# Patient Record
Sex: MALE | Race: BLACK OR AFRICAN AMERICAN | NOT HISPANIC OR LATINO | Employment: OTHER | URBAN - METROPOLITAN AREA
[De-identification: names, ages, dates, MRNs, and addresses within clinical notes are randomized per-mention and may not be internally consistent; named-entity substitution may affect disease eponyms.]

---

## 2024-04-07 ENCOUNTER — APPOINTMENT (OUTPATIENT)
Dept: MRI IMAGING | Facility: CLINIC | Age: 69
DRG: 065 | End: 2024-04-07
Attending: STUDENT IN AN ORGANIZED HEALTH CARE EDUCATION/TRAINING PROGRAM
Payer: MEDICARE

## 2024-04-07 ENCOUNTER — HOSPITAL ENCOUNTER (INPATIENT)
Facility: CLINIC | Age: 69
LOS: 3 days | Discharge: HOME HEALTH | DRG: 065 | End: 2024-04-10
Attending: STUDENT IN AN ORGANIZED HEALTH CARE EDUCATION/TRAINING PROGRAM | Admitting: INTERNAL MEDICINE
Payer: MEDICARE

## 2024-04-07 DIAGNOSIS — R47.1 DYSARTHRIA: ICD-10-CM

## 2024-04-07 DIAGNOSIS — R29.898 RIGHT ARM WEAKNESS: ICD-10-CM

## 2024-04-07 DIAGNOSIS — I10 HYPERTENSION, UNSPECIFIED TYPE: ICD-10-CM

## 2024-04-07 DIAGNOSIS — I63.9 ACUTE ISCHEMIC STROKE (H): Primary | ICD-10-CM

## 2024-04-07 DIAGNOSIS — R20.0 NUMBNESS: ICD-10-CM

## 2024-04-07 DIAGNOSIS — E11.9 TYPE 2 DIABETES MELLITUS WITHOUT COMPLICATION, UNSPECIFIED WHETHER LONG TERM INSULIN USE (H): ICD-10-CM

## 2024-04-07 DIAGNOSIS — R79.89 ELEVATED TROPONIN: ICD-10-CM

## 2024-04-07 LAB
ANION GAP SERPL CALCULATED.3IONS-SCNC: 16 MMOL/L (ref 7–15)
APTT PPP: 34 SECONDS (ref 22–38)
ATRIAL RATE - MUSE: 105 BPM
BASOPHILS # BLD AUTO: 0 10E3/UL (ref 0–0.2)
BASOPHILS NFR BLD AUTO: 0 %
BUN SERPL-MCNC: 18.3 MG/DL (ref 8–23)
CALCIUM SERPL-MCNC: 9.1 MG/DL (ref 8.8–10.2)
CHLORIDE SERPL-SCNC: 100 MMOL/L (ref 98–107)
CHOLEST SERPL-MCNC: 181 MG/DL
CREAT SERPL-MCNC: 1.23 MG/DL (ref 0.67–1.17)
DEPRECATED HCO3 PLAS-SCNC: 22 MMOL/L (ref 22–29)
DIASTOLIC BLOOD PRESSURE - MUSE: NORMAL MMHG
EGFRCR SERPLBLD CKD-EPI 2021: 63 ML/MIN/1.73M2
EOSINOPHIL # BLD AUTO: 0 10E3/UL (ref 0–0.7)
EOSINOPHIL NFR BLD AUTO: 1 %
ERYTHROCYTE [DISTWIDTH] IN BLOOD BY AUTOMATED COUNT: 12.5 % (ref 10–15)
GLUCOSE BLDC GLUCOMTR-MCNC: 117 MG/DL (ref 70–99)
GLUCOSE BLDC GLUCOMTR-MCNC: 262 MG/DL (ref 70–99)
GLUCOSE SERPL-MCNC: 263 MG/DL (ref 70–99)
HBA1C MFR BLD: 7.3 %
HCT VFR BLD AUTO: 36.8 % (ref 40–53)
HDLC SERPL-MCNC: 28 MG/DL
HGB BLD-MCNC: 12.4 G/DL (ref 13.3–17.7)
IMM GRANULOCYTES # BLD: 0 10E3/UL
IMM GRANULOCYTES NFR BLD: 0 %
INR PPP: 1.07 (ref 0.85–1.15)
INTERPRETATION ECG - MUSE: NORMAL
LDLC SERPL CALC-MCNC: 113 MG/DL
LYMPHOCYTES # BLD AUTO: 1 10E3/UL (ref 0.8–5.3)
LYMPHOCYTES NFR BLD AUTO: 32 %
MCH RBC QN AUTO: 27.1 PG (ref 26.5–33)
MCHC RBC AUTO-ENTMCNC: 33.7 G/DL (ref 31.5–36.5)
MCV RBC AUTO: 81 FL (ref 78–100)
MONOCYTES # BLD AUTO: 0.2 10E3/UL (ref 0–1.3)
MONOCYTES NFR BLD AUTO: 7 %
NEUTROPHILS # BLD AUTO: 1.7 10E3/UL (ref 1.6–8.3)
NEUTROPHILS NFR BLD AUTO: 60 %
NONHDLC SERPL-MCNC: 153 MG/DL
NRBC # BLD AUTO: 0 10E3/UL
NRBC BLD AUTO-RTO: 0 /100
P AXIS - MUSE: 266 DEGREES
PLATELET # BLD AUTO: 123 10E3/UL (ref 150–450)
POTASSIUM SERPL-SCNC: 3.7 MMOL/L (ref 3.4–5.3)
PR INTERVAL - MUSE: 166 MS
QRS DURATION - MUSE: 76 MS
QT - MUSE: 350 MS
QTC - MUSE: 462 MS
R AXIS - MUSE: -4 DEGREES
RBC # BLD AUTO: 4.57 10E6/UL (ref 4.4–5.9)
SODIUM SERPL-SCNC: 138 MMOL/L (ref 135–145)
SYSTOLIC BLOOD PRESSURE - MUSE: NORMAL MMHG
T AXIS - MUSE: 268 DEGREES
TRIGL SERPL-MCNC: 199 MG/DL
TROPONIN T SERPL HS-MCNC: 74 NG/L
TROPONIN T SERPL HS-MCNC: 76 NG/L
TROPONIN T SERPL HS-MCNC: 93 NG/L
TSH SERPL DL<=0.005 MIU/L-ACNC: 2.22 UIU/ML (ref 0.3–4.2)
VENTRICULAR RATE- MUSE: 105 BPM
WBC # BLD AUTO: 3 10E3/UL (ref 4–11)

## 2024-04-07 PROCEDURE — 70549 MR ANGIOGRAPH NECK W/O&W/DYE: CPT

## 2024-04-07 PROCEDURE — 99223 1ST HOSP IP/OBS HIGH 75: CPT | Mod: AI | Performed by: INTERNAL MEDICINE

## 2024-04-07 PROCEDURE — A9585 GADOBUTROL INJECTION: HCPCS | Performed by: STUDENT IN AN ORGANIZED HEALTH CARE EDUCATION/TRAINING PROGRAM

## 2024-04-07 PROCEDURE — 85610 PROTHROMBIN TIME: CPT | Performed by: STUDENT IN AN ORGANIZED HEALTH CARE EDUCATION/TRAINING PROGRAM

## 2024-04-07 PROCEDURE — 70544 MR ANGIOGRAPHY HEAD W/O DYE: CPT

## 2024-04-07 PROCEDURE — 85730 THROMBOPLASTIN TIME PARTIAL: CPT | Performed by: STUDENT IN AN ORGANIZED HEALTH CARE EDUCATION/TRAINING PROGRAM

## 2024-04-07 PROCEDURE — 85025 COMPLETE CBC W/AUTO DIFF WBC: CPT | Performed by: STUDENT IN AN ORGANIZED HEALTH CARE EDUCATION/TRAINING PROGRAM

## 2024-04-07 PROCEDURE — 99285 EMERGENCY DEPT VISIT HI MDM: CPT | Mod: 25

## 2024-04-07 PROCEDURE — 82962 GLUCOSE BLOOD TEST: CPT

## 2024-04-07 PROCEDURE — 255N000002 HC RX 255 OP 636: Performed by: STUDENT IN AN ORGANIZED HEALTH CARE EDUCATION/TRAINING PROGRAM

## 2024-04-07 PROCEDURE — 80048 BASIC METABOLIC PNL TOTAL CA: CPT | Performed by: STUDENT IN AN ORGANIZED HEALTH CARE EDUCATION/TRAINING PROGRAM

## 2024-04-07 PROCEDURE — 36415 COLL VENOUS BLD VENIPUNCTURE: CPT | Performed by: STUDENT IN AN ORGANIZED HEALTH CARE EDUCATION/TRAINING PROGRAM

## 2024-04-07 PROCEDURE — 83036 HEMOGLOBIN GLYCOSYLATED A1C: CPT | Performed by: INTERNAL MEDICINE

## 2024-04-07 PROCEDURE — 84484 ASSAY OF TROPONIN QUANT: CPT | Performed by: INTERNAL MEDICINE

## 2024-04-07 PROCEDURE — 93005 ELECTROCARDIOGRAM TRACING: CPT

## 2024-04-07 PROCEDURE — 84484 ASSAY OF TROPONIN QUANT: CPT | Performed by: STUDENT IN AN ORGANIZED HEALTH CARE EDUCATION/TRAINING PROGRAM

## 2024-04-07 PROCEDURE — 36415 COLL VENOUS BLD VENIPUNCTURE: CPT | Performed by: INTERNAL MEDICINE

## 2024-04-07 PROCEDURE — 84443 ASSAY THYROID STIM HORMONE: CPT | Performed by: INTERNAL MEDICINE

## 2024-04-07 PROCEDURE — 80061 LIPID PANEL: CPT | Performed by: INTERNAL MEDICINE

## 2024-04-07 PROCEDURE — 120N000001 HC R&B MED SURG/OB

## 2024-04-07 PROCEDURE — 70553 MRI BRAIN STEM W/O & W/DYE: CPT

## 2024-04-07 RX ORDER — ONDANSETRON 4 MG/1
4 TABLET, ORALLY DISINTEGRATING ORAL EVERY 6 HOURS PRN
Status: DISCONTINUED | OUTPATIENT
Start: 2024-04-07 | End: 2024-04-10 | Stop reason: HOSPADM

## 2024-04-07 RX ORDER — NICOTINE POLACRILEX 4 MG
15-30 LOZENGE BUCCAL
Status: DISCONTINUED | OUTPATIENT
Start: 2024-04-07 | End: 2024-04-10 | Stop reason: HOSPADM

## 2024-04-07 RX ORDER — ASPIRIN 81 MG/1
324 TABLET, CHEWABLE ORAL DAILY
Status: DISCONTINUED | OUTPATIENT
Start: 2024-04-08 | End: 2024-04-10 | Stop reason: HOSPADM

## 2024-04-07 RX ORDER — ASPIRIN 300 MG/1
300 SUPPOSITORY RECTAL DAILY
Status: DISCONTINUED | OUTPATIENT
Start: 2024-04-08 | End: 2024-04-10 | Stop reason: HOSPADM

## 2024-04-07 RX ORDER — GADOBUTROL 604.72 MG/ML
10 INJECTION INTRAVENOUS ONCE
Status: COMPLETED | OUTPATIENT
Start: 2024-04-07 | End: 2024-04-07

## 2024-04-07 RX ORDER — ONDANSETRON 2 MG/ML
4 INJECTION INTRAMUSCULAR; INTRAVENOUS EVERY 6 HOURS PRN
Status: DISCONTINUED | OUTPATIENT
Start: 2024-04-07 | End: 2024-04-10 | Stop reason: HOSPADM

## 2024-04-07 RX ORDER — ASPIRIN 325 MG
325 TABLET, DELAYED RELEASE (ENTERIC COATED) ORAL DAILY
Status: DISCONTINUED | OUTPATIENT
Start: 2024-04-08 | End: 2024-04-10 | Stop reason: HOSPADM

## 2024-04-07 RX ORDER — LIDOCAINE 40 MG/G
CREAM TOPICAL
Status: DISCONTINUED | OUTPATIENT
Start: 2024-04-07 | End: 2024-04-10 | Stop reason: HOSPADM

## 2024-04-07 RX ORDER — HYDRALAZINE HYDROCHLORIDE 20 MG/ML
10-20 INJECTION INTRAMUSCULAR; INTRAVENOUS
Status: DISCONTINUED | OUTPATIENT
Start: 2024-04-07 | End: 2024-04-10 | Stop reason: HOSPADM

## 2024-04-07 RX ORDER — DEXTROSE MONOHYDRATE 25 G/50ML
25-50 INJECTION, SOLUTION INTRAVENOUS
Status: DISCONTINUED | OUTPATIENT
Start: 2024-04-07 | End: 2024-04-10 | Stop reason: HOSPADM

## 2024-04-07 RX ORDER — LABETALOL HYDROCHLORIDE 5 MG/ML
10-20 INJECTION, SOLUTION INTRAVENOUS EVERY 10 MIN PRN
Status: DISCONTINUED | OUTPATIENT
Start: 2024-04-07 | End: 2024-04-10 | Stop reason: HOSPADM

## 2024-04-07 RX ADMIN — GADOBUTROL 10 ML: 604.72 INJECTION INTRAVENOUS at 18:12

## 2024-04-07 ASSESSMENT — ACTIVITIES OF DAILY LIVING (ADL)
ADLS_ACUITY_SCORE: 35
ADLS_ACUITY_SCORE: 33
ADLS_ACUITY_SCORE: 35
ADLS_ACUITY_SCORE: 33

## 2024-04-07 NOTE — ED NOTES
[facility]  ED Nurse Handoff Report    ED Chief complaint: Numbness (/)      ED Diagnosis:   Final diagnoses:   Acute ischemic stroke (H)       Code Status: not addressed at this time    Allergies: No Known Allergies    Patient Story: Pt thinks he has a stroke on Thursday   Pt has numbness in his right hand   Focused Assessment:  Pt forgetful, disoriented to situation. HTN,  on RA. Son at bedside,  Labs Ordered and Resulted from Time of ED Arrival to Time of ED Departure   BASIC METABOLIC PANEL - Abnormal       Result Value    Sodium 138      Potassium 3.7      Chloride 100      Carbon Dioxide (CO2) 22      Anion Gap 16 (*)     Urea Nitrogen 18.3      Creatinine 1.23 (*)     GFR Estimate 63      Calcium 9.1      Glucose 263 (*)    TROPONIN T, HIGH SENSITIVITY - Abnormal    Troponin T, High Sensitivity 74 (*)    GLUCOSE BY METER - Abnormal    GLUCOSE BY METER POCT 262 (*)    CBC WITH PLATELETS AND DIFFERENTIAL - Abnormal    WBC Count 3.0 (*)     RBC Count 4.57      Hemoglobin 12.4 (*)     Hematocrit 36.8 (*)     MCV 81      MCH 27.1      MCHC 33.7      RDW 12.5      Platelet Count 123 (*)     % Neutrophils 60      % Lymphocytes 32      % Monocytes 7      % Eosinophils 1      % Basophils 0      % Immature Granulocytes 0      NRBCs per 100 WBC 0      Absolute Neutrophils 1.7      Absolute Lymphocytes 1.0      Absolute Monocytes 0.2      Absolute Eosinophils 0.0      Absolute Basophils 0.0      Absolute Immature Granulocytes 0.0      Absolute NRBCs 0.0     TROPONIN T, HIGH SENSITIVITY - Abnormal    Troponin T, High Sensitivity 76 (*)    INR - Normal    INR 1.07     PARTIAL THROMBOPLASTIN TIME - Normal    aPTT 34     GLUCOSE MONITOR NURSING POCT      MRA Brain ([place]) wo Contrast   Final Result   IMPRESSION:   HEAD MRI:   1.  Acute lacunar infarcts at the left central maxine and left frontoparietal corona radiata. No associated hemorrhage.   2.  Moderate chronic small vessel ischemic change.     "  HEAD MRA:   Normal MRA [place].      NECK MRA:   Normal neck MRA.         MRA Neck ([facility]ot[facility]) wo & w Contrast   Final Result   IMPRESSION:   HEAD MRI:   1.  Acute lacunar infarcts at the left central maxine and left frontoparietal corona radiata. No associated hemorrhage.   2.  Moderate chronic small vessel ischemic change.      HEAD MRA:   Normal MRA [place].      NECK MRA:   Normal neck MRA.         MR Brain w/o & w Contrast   Final Result   IMPRESSION:   HEAD MRI:   1.  Acute lacunar infarcts at the left central maxine and left frontoparietal corona radiata. No associated hemorrhage.   2.  Moderate chronic small vessel ischemic change.      HEAD MRA:   Normal MRA [place].      NECK MRA:   Normal neck MRA.              Treatments and/or interventions provided: see MAR  Patient's response to treatments and/or interventions: cont to assess    To be done/followed up on inpatient unit:  cont with admitting MD orders    Does this patient have any cognitive concerns?: Forgetful    Activity level - Baseline/Home:  Independent  Activity Level - Current:   Stand with Assist    Patient's Preferred language: English   Needed?: No    Isolation: None  Infection: Not Applicable  Patient tested for COVID 19 prior to admission: NO  Bariatric?: No    Vital Signs:   Vitals:    04/07/24 1500 04/07/24 1600 04/07/24 1630 04/07/24 1700   BP: (!) 230/119 (!) 171/99 (!) 180/106 (!) 176/105   Pulse: 103 92 93 83   Resp: 16   18   Temp: 98  F (36.7  C)      TempSrc: Oral      SpO2: 99% 94% 94% 96%   Weight: 99.8 kg (220 lb)      Height: 1.778 m (5' 10\")          Cardiac Rhythm:     Was the PSS-3 completed:   Yes  What interventions are required if any?               Family Comments: at bedside      For the majority of the shift this patient's behavior was Green.   Behavioral interventions performed were none.    ED NURSE PHONE NUMBER: [phone]        "

## 2024-04-07 NOTE — ED PROVIDER NOTES
"History   Chief Complaint:  Numbness     HPI:  Chucho Ac is a pleasant 68 year old male with history of hypertension, hypertriglyceridemia, and type 2 diabetes presenting with numbness. The patient's son reports that the patient lives alone and had an unwitnessed stroke 3 days ago without informing anyone of it. The patient states that at the time he was having difficulty speaking and numbness in his right hand and right leg. He was not medically evaluated for the stroke. Son states that he noticed the patient was still residually weak and slurring his speech more than his baseline, so they drove to the ED for evaluation. The patient denies vision changes, feelings of unsteadiness, fever, chills, nausea, vomiting, diarrhea, or cough. No past history of stroke. The patient notes that he regularly takes 24 units of insulin once a day as well as lisinopril-hydrochlorothiazide for his hypertension, though he has not been taking his hypertensive for about 2 months due to running out of his supply.         Independent Historian:  Independent history was obtained from the patient's son. They provided information detailed above in HPI.     Review of External Notes:  None.    I personally reviewed the patient's chart, including available medication list and available past medical history, past surgical history, family history, and social history.    Physical Exam   Patient Vitals for the past 24 hrs:   BP Temp Temp src Pulse Resp SpO2 Height Weight   04/07/24 2044 (!) 178/128 -- -- 80 18 -- -- --   04/07/24 2003 -- -- -- 87 -- 94 % -- --   04/07/24 1900 (!) 175/120 -- -- 78 -- 96 % -- --   04/07/24 1700 (!) 176/105 -- -- 83 18 96 % -- --   04/07/24 1630 (!) 180/106 -- -- 93 -- 94 % -- --   04/07/24 1600 (!) 171/99 -- -- 92 -- 94 % -- --   04/07/24 1500 (!) 230/119 98  F (36.7  C) Oral 103 16 99 % 1.778 m (5' 10\") 99.8 kg (220 lb)      Physical Exam  Vitals and nursing note reviewed.   Constitutional:       Appearance: " He is obese. He is not ill-appearing.   Eyes:      Extraocular Movements: Extraocular movements intact.      Pupils: Pupils are equal, round, and reactive to light.   Cardiovascular:      Rate and Rhythm: Normal rate and regular rhythm.      Heart sounds: Normal heart sounds.   Pulmonary:      Effort: Pulmonary effort is normal.   Skin:     General: Skin is warm and dry.   Neurological:      Mental Status: He is alert and oriented to person, place, and time.      Cranial Nerves: Dysarthria (Slightly slurred speech per patient's son) present. No facial asymmetry.      Sensory: Sensation is intact.      Motor: Weakness (4 out of 5 strength in right upper extremity compared to 5 out of 5 in left upper extremity) present.      Coordination: Coordination is intact.      Gait: Gait is intact.          Emergency Department Course     ECG:   ECG:  ECG taken at 1524, ECG read at 1850  Unusual P axis, possible ectopic atrial tachycardia with occasional premature ventricular complexes  T wave inversions in leads V4, V5, V6  Abnormal ECG   Rate 105 bpm. SC interval 166 ms. QRS duration 76 ms. QT/QTc 350/462 ms. P-R-T axes 266--4-268.   No previous ECG available for comparison.   My interpretation     Imaging:   MRA Brain ([place] [facility] [place]) wo Contrast   Final Result   IMPRESSION:   HEAD MRI:   1.  Acute lacunar infarcts at the left central maxine and left frontoparietal corona radiata. No associated hemorrhage.   2.  Moderate chronic small vessel ischemic change.      HEAD MRA:   Normal MRA [place].      NECK MRA:   Normal neck MRA.         MRA Neck ([facility]) wo & w Contrast   Final Result   IMPRESSION:   HEAD MRI:   1.  Acute lacunar infarcts at the left central maxine and left frontoparietal corona radiata. No associated hemorrhage.   2.  Moderate chronic small vessel ischemic change.      HEAD MRA:   Normal MRA [place].      NECK MRA:   Normal neck MRA.         MR Brain w/o & w Contrast   Final Result    IMPRESSION:   HEAD MRI:   1.  Acute lacunar infarcts at the left central maxine and left frontoparietal corona radiata. No associated hemorrhage.   2.  Moderate chronic small vessel ischemic change.      HEAD MRA:   Normal MRA [place] [facility] [place].      NECK MRA:   Normal neck MRA.         Echocardiogram Complete - For age > 60 yrs    (Results Pending)       Report(s) per radiology.      Laboratory:   Labs Ordered and Resulted from Time of ED Arrival to Time of ED Departure   BASIC METABOLIC PANEL - Abnormal       Result Value    Sodium 138      Potassium 3.7      Chloride 100      Carbon Dioxide (CO2) 22      Anion Gap 16 (*)     Urea Nitrogen 18.3      Creatinine 1.23 (*)     GFR Estimate 63      Calcium 9.1      Glucose 263 (*)    TROPONIN T, HIGH SENSITIVITY - Abnormal    Troponin T, High Sensitivity 74 (*)    GLUCOSE BY METER - Abnormal    GLUCOSE BY METER POCT 262 (*)    CBC WITH PLATELETS AND DIFFERENTIAL - Abnormal    WBC Count 3.0 (*)     RBC Count 4.57      Hemoglobin 12.4 (*)     Hematocrit 36.8 (*)     MCV 81      MCH 27.1      MCHC 33.7      RDW 12.5      Platelet Count 123 (*)     % Neutrophils 60      % Lymphocytes 32      % Monocytes 7      % Eosinophils 1      % Basophils 0      % Immature Granulocytes 0      NRBCs per 100 WBC 0      Absolute Neutrophils 1.7      Absolute Lymphocytes 1.0      Absolute Monocytes 0.2      Absolute Eosinophils 0.0      Absolute Basophils 0.0      Absolute Immature Granulocytes 0.0      Absolute NRBCs 0.0     TROPONIN T, HIGH SENSITIVITY - Abnormal    Troponin T, High Sensitivity 76 (*)    HEMOGLOBIN A1C - Abnormal    Hemoglobin A1C 7.3 (*)    TROPONIN T, HIGH SENSITIVITY - Abnormal    Troponin T, High Sensitivity 93 (*)    GLUCOSE BY METER - Abnormal    GLUCOSE BY METER POCT 117 (*)    INR - Normal    INR 1.07     PARTIAL THROMBOPLASTIN TIME - Normal    aPTT 34     TSH - Normal    TSH 2.22     GLUCOSE MONITOR NURSING POCT   LIPID PROFILE        Procedures  None  performed    Interventions & Assessments:  Interventions:  Medications   labetalol (NORMODYNE/TRANDATE) injection 10-20 mg (has no administration in time range)     Or   hydrALAZINE (APRESOLINE) injection 10-20 mg (has no administration in time range)   insulin aspart (NovoLOG) injection (RAPID ACTING) (0 Units Subcutaneous Not Given 4/7/24 2304)   gadobutrol (GADAVIST) injection 10 mL (10 mLs Intravenous $Given 4/7/24 1812)        Assessments:  Consultations/Discussion of Management or Tests:  Independent Interpretation (X-rays, CT Head, rhythm strip):  ED Course as of 04/07/24 2306   Sun Apr 07, 2024   1520 I obtained history and examined the patient as noted above   1928 I spoke with Dr. Pinzon, hospitalist, who accepts the patient       Social Determinants of Health affecting care:   None.      Disposition:  The patient was admitted to the hospital under the care of Dr. Pinzon.     Impression & Plan     MIPS (If applicable):  N/A    Medical Decision Making:  Patient presenting with concern for stroke.  Vital signs notable for significantly elevated blood pressure.  Patient has mildly slurred speech per son.  Do appreciate mild right-sided weakness on my exam.  Concern for stroke versus TIA, metabolic derangement, diabetic emergency, hypertensive emergency, among others.  Proceeded with labs and given timeline MRI/MRA.  Imaging was confirmatory of stroke.  Other workup was notable for elevated troponin, which was not rising significantly on repeat.  Patient did not have any anginal symptoms or anginal equivalents.  EKG did not display any acute appearing ischemic changes.  It is possible that his elevated troponin is secondary to uncontrolled hypertension/hypertensive emergency.  Patient was started on as needed medications for blood pressure management.  Patient was admitted to hospitalist, neuro floor, for further evaluation and management and neurology consult.    Diagnosis:    ICD-10-CM    1. Acute ischemic  stroke (H)  I63.9       2. Dysarthria  R47.1       3. Right arm weakness  R29.898       4. Numbness  R20.0       5. Hypertension, unspecified type  I10       6. Elevated troponin  R79.89              Scribe Disclosure:  I, David Robbin, am serving as a scribe at 3:06 PM on 4/7/2024 to document services personally performed by Demetrio Hoffman MD based on my observations and the provider's statements to me.   4/7/2024   Demetrio Hoffman MD [place], Samuel, MD  04/07/24 9804

## 2024-04-08 ENCOUNTER — APPOINTMENT (OUTPATIENT)
Dept: PHYSICAL THERAPY | Facility: CLINIC | Age: 69
DRG: 065 | End: 2024-04-08
Attending: INTERNAL MEDICINE
Payer: MEDICARE

## 2024-04-08 ENCOUNTER — APPOINTMENT (OUTPATIENT)
Dept: OCCUPATIONAL THERAPY | Facility: CLINIC | Age: 69
DRG: 065 | End: 2024-04-08
Attending: INTERNAL MEDICINE
Payer: MEDICARE

## 2024-04-08 ENCOUNTER — APPOINTMENT (OUTPATIENT)
Dept: CARDIOLOGY | Facility: CLINIC | Age: 69
DRG: 065 | End: 2024-04-08
Attending: STUDENT IN AN ORGANIZED HEALTH CARE EDUCATION/TRAINING PROGRAM
Payer: MEDICARE

## 2024-04-08 ENCOUNTER — APPOINTMENT (OUTPATIENT)
Dept: SPEECH THERAPY | Facility: CLINIC | Age: 69
DRG: 065 | End: 2024-04-08
Attending: INTERNAL MEDICINE
Payer: MEDICARE

## 2024-04-08 ENCOUNTER — APPOINTMENT (OUTPATIENT)
Dept: CARDIOLOGY | Facility: CLINIC | Age: 69
DRG: 065 | End: 2024-04-08
Attending: INTERNAL MEDICINE
Payer: MEDICARE

## 2024-04-08 LAB
3D LVEF ECHO: NORMAL
ANION GAP SERPL CALCULATED.3IONS-SCNC: 13 MMOL/L (ref 7–15)
BUN SERPL-MCNC: 12.7 MG/DL (ref 8–23)
CALCIUM SERPL-MCNC: 9.4 MG/DL (ref 8.8–10.2)
CHLORIDE SERPL-SCNC: 98 MMOL/L (ref 98–107)
CREAT SERPL-MCNC: 1.02 MG/DL (ref 0.67–1.17)
DEPRECATED HCO3 PLAS-SCNC: 26 MMOL/L (ref 22–29)
EGFRCR SERPLBLD CKD-EPI 2021: 79 ML/MIN/1.73M2
ERYTHROCYTE [DISTWIDTH] IN BLOOD BY AUTOMATED COUNT: 12.7 % (ref 10–15)
GLUCOSE BLDC GLUCOMTR-MCNC: 128 MG/DL (ref 70–99)
GLUCOSE BLDC GLUCOMTR-MCNC: 134 MG/DL (ref 70–99)
GLUCOSE BLDC GLUCOMTR-MCNC: 163 MG/DL (ref 70–99)
GLUCOSE BLDC GLUCOMTR-MCNC: 221 MG/DL (ref 70–99)
GLUCOSE SERPL-MCNC: 212 MG/DL (ref 70–99)
HCT VFR BLD AUTO: 39.4 % (ref 40–53)
HGB BLD-MCNC: 12.9 G/DL (ref 13.3–17.7)
LVEF ECHO: NORMAL
MAGNESIUM SERPL-MCNC: 2 MG/DL (ref 1.7–2.3)
MCH RBC QN AUTO: 26.5 PG (ref 26.5–33)
MCHC RBC AUTO-ENTMCNC: 32.7 G/DL (ref 31.5–36.5)
MCV RBC AUTO: 81 FL (ref 78–100)
PLATELET # BLD AUTO: 138 10E3/UL (ref 150–450)
POTASSIUM SERPL-SCNC: 4.2 MMOL/L (ref 3.4–5.3)
RBC # BLD AUTO: 4.87 10E6/UL (ref 4.4–5.9)
SODIUM SERPL-SCNC: 137 MMOL/L (ref 135–145)
TROPONIN T SERPL HS-MCNC: 80 NG/L
WBC # BLD AUTO: 4.2 10E3/UL (ref 4–11)

## 2024-04-08 PROCEDURE — 97165 OT EVAL LOW COMPLEX 30 MIN: CPT | Mod: GO

## 2024-04-08 PROCEDURE — 250N000012 HC RX MED GY IP 250 OP 636 PS 637: Performed by: INTERNAL MEDICINE

## 2024-04-08 PROCEDURE — 75572 CT HRT W/3D IMAGE: CPT | Mod: MG

## 2024-04-08 PROCEDURE — 99233 SBSQ HOSP IP/OBS HIGH 50: CPT | Performed by: INTERNAL MEDICINE

## 2024-04-08 PROCEDURE — 250N000013 HC RX MED GY IP 250 OP 250 PS 637: Performed by: STUDENT IN AN ORGANIZED HEALTH CARE EDUCATION/TRAINING PROGRAM

## 2024-04-08 PROCEDURE — 97162 PT EVAL MOD COMPLEX 30 MIN: CPT | Mod: GP

## 2024-04-08 PROCEDURE — 75572 CT HRT W/3D IMAGE: CPT | Mod: 26 | Performed by: INTERNAL MEDICINE

## 2024-04-08 PROCEDURE — 84484 ASSAY OF TROPONIN QUANT: CPT | Performed by: INTERNAL MEDICINE

## 2024-04-08 PROCEDURE — 93306 TTE W/DOPPLER COMPLETE: CPT

## 2024-04-08 PROCEDURE — 250N000013 HC RX MED GY IP 250 OP 250 PS 637: Performed by: INTERNAL MEDICINE

## 2024-04-08 PROCEDURE — 82374 ASSAY BLOOD CARBON DIOXIDE: CPT | Performed by: INTERNAL MEDICINE

## 2024-04-08 PROCEDURE — 36415 COLL VENOUS BLD VENIPUNCTURE: CPT | Performed by: INTERNAL MEDICINE

## 2024-04-08 PROCEDURE — 92610 EVALUATE SWALLOWING FUNCTION: CPT | Mod: GN | Performed by: SPEECH-LANGUAGE PATHOLOGIST

## 2024-04-08 PROCEDURE — 97530 THERAPEUTIC ACTIVITIES: CPT | Mod: GO

## 2024-04-08 PROCEDURE — 120N000001 HC R&B MED SURG/OB

## 2024-04-08 PROCEDURE — 99221 1ST HOSP IP/OBS SF/LOW 40: CPT | Mod: GC | Performed by: PSYCHIATRY & NEUROLOGY

## 2024-04-08 PROCEDURE — 83735 ASSAY OF MAGNESIUM: CPT | Performed by: INTERNAL MEDICINE

## 2024-04-08 PROCEDURE — 250N000011 HC RX IP 250 OP 636: Performed by: STUDENT IN AN ORGANIZED HEALTH CARE EDUCATION/TRAINING PROGRAM

## 2024-04-08 PROCEDURE — 97116 GAIT TRAINING THERAPY: CPT | Mod: GP

## 2024-04-08 PROCEDURE — 93306 TTE W/DOPPLER COMPLETE: CPT | Mod: 26 | Performed by: INTERNAL MEDICINE

## 2024-04-08 PROCEDURE — 85027 COMPLETE CBC AUTOMATED: CPT | Performed by: INTERNAL MEDICINE

## 2024-04-08 PROCEDURE — G1010 CDSM STANSON: HCPCS | Performed by: INTERNAL MEDICINE

## 2024-04-08 PROCEDURE — 250N000011 HC RX IP 250 OP 636: Performed by: INTERNAL MEDICINE

## 2024-04-08 RX ORDER — AMLODIPINE BESYLATE 5 MG/1
5 TABLET ORAL DAILY
Status: DISCONTINUED | OUTPATIENT
Start: 2024-04-08 | End: 2024-04-10 | Stop reason: HOSPADM

## 2024-04-08 RX ORDER — ATORVASTATIN CALCIUM 80 MG/1
80 TABLET, FILM COATED ORAL EVERY EVENING
Status: DISCONTINUED | OUTPATIENT
Start: 2024-04-08 | End: 2024-04-10 | Stop reason: HOSPADM

## 2024-04-08 RX ORDER — LABETALOL HYDROCHLORIDE 5 MG/ML
10 INJECTION, SOLUTION INTRAVENOUS ONCE
Status: COMPLETED | OUTPATIENT
Start: 2024-04-08 | End: 2024-04-08

## 2024-04-08 RX ORDER — IOPAMIDOL 755 MG/ML
500 INJECTION, SOLUTION INTRAVASCULAR ONCE
Status: COMPLETED | OUTPATIENT
Start: 2024-04-08 | End: 2024-04-08

## 2024-04-08 RX ADMIN — ATORVASTATIN CALCIUM 80 MG: 80 TABLET, FILM COATED ORAL at 19:46

## 2024-04-08 RX ADMIN — ASPIRIN 325 MG: 325 TABLET, COATED ORAL at 00:19

## 2024-04-08 RX ADMIN — IOPAMIDOL 100 ML: 755 INJECTION, SOLUTION INTRAVENOUS at 14:00

## 2024-04-08 RX ADMIN — AMLODIPINE BESYLATE 5 MG: 5 TABLET ORAL at 17:58

## 2024-04-08 RX ADMIN — ASPIRIN 325 MG: 325 TABLET, COATED ORAL at 15:08

## 2024-04-08 RX ADMIN — LABETALOL HYDROCHLORIDE 10 MG: 5 INJECTION INTRAVENOUS at 15:43

## 2024-04-08 RX ADMIN — INSULIN ASPART 4 UNITS: 100 INJECTION, SOLUTION INTRAVENOUS; SUBCUTANEOUS at 12:58

## 2024-04-08 ASSESSMENT — ACTIVITIES OF DAILY LIVING (ADL)
ADLS_ACUITY_SCORE: 37
ADLS_ACUITY_SCORE: 36
ADLS_ACUITY_SCORE: 38
ADLS_ACUITY_SCORE: 37
ADLS_ACUITY_SCORE: 39
ADLS_ACUITY_SCORE: 38
ADLS_ACUITY_SCORE: 37
ADLS_ACUITY_SCORE: 36
ADLS_ACUITY_SCORE: 38
ADLS_ACUITY_SCORE: 36
ADLS_ACUITY_SCORE: 37
ADLS_ACUITY_SCORE: 36
ADLS_ACUITY_SCORE: 37
ADLS_ACUITY_SCORE: 39
ADLS_ACUITY_SCORE: 37
ADLS_ACUITY_SCORE: 37
ADLS_ACUITY_SCORE: 36
ADLS_ACUITY_SCORE: 37
ADLS_ACUITY_SCORE: 36

## 2024-04-08 NOTE — PROVIDER NOTIFICATION
MD Notification    Notified Person: MD    Notified Person Name:     Notification Date/Time: April 8, 2024 at 1225      Notification Interaction: vocera message    Purpose of Notification: Pt /115, has PRN hydralazine for diastolic of 110. Did you want this done or adjust paramaters?     Orders Received: adjusted BP parameters to notify if diastolic >120    Comments:

## 2024-04-08 NOTE — H&P
[facility]    History and Physical - Hospitalist Service       Date of Admission:  4/7/2024    Assessment & Plan      Chucho Ac is a 68 year old male with history of insulin-dependent diabetes mellitus, hypertension and hyperlipidemia who presents with right-sided numbness and weakness and difficulty speaking.    Acute lacunar infarct involving the left central maxine and left frontoparietal corona radiata  -Out of window for intervention as his symptoms started 3 days prior to presentation  -MRI brain shows acute lacunar infarct of the left central maxine and left frontoparietal corona radiata with no associated hemorrhage and moderate chronic small vessel ischemic changes  -Head and neck MRA with no significant stenosis/occlusion  -Admit in telemetry, neurochecks q. 4 hour, neurology consultation  -Will start patient on full dose aspirin until at least evaluated by neurology, further antiplatelet recommendation per neurology  -Echo ordered  - Allow permissive hypertension, euvolemia, euthermia  -Check lipid panel, hemoglobin A1c  -Statin once speech clears him  -PT OT and speech evaluation, n.p.o. until bedside evaluation/evaluated by speech    Hypertension  Type 2 diabetes mellitus  Hyperlipidemia  Medical noncompliance  -Patient does report to history of hypertension hyperlipidemia and insulin-dependent diabetes mellitus.  However reports that he has not been taking his medication including insulin for more than a month now given he is unable to monitor his blood sugars or blood pressure  -Currently allowing permissive hypertension, as needed antihypertensives as per stroke protocol  -Blood sugar on arrival was 263, check A1c  -Start with high resistance sliding scale insulin monitor and adjust medication as needed  -Statin once oral intake initiated    Elevated troponin  -Patient denies any chest pain.  Troponin at presentation 74, repeat 76  -EKG with T wave inversion in lateral  "leads but no previous EKG to compare  -Could be demand ischemia from acute stroke/hypertension as above  -Being monitored in telemetry as above  -Echocardiogram for stroke workup as above    Anemia, mild  Leukopenia, mild  Thrombocytopenia, mild  -No baseline to compare  -Monitor and if remains stable outpatient follow-up    Possible CKD stage II  -Previous labs from 2011 shows creatinine at baseline 1.1-1.2.  -Creatinine at presentation 1.23, monitor, avoid nephrotoxins        Diet:  Moderate carb control diet if passes bedside evaluation/cleared by speech  DVT Prophylaxis: Pneumatic Compression Devices  D[place]ont Catheter: Not present  Lines: None     Cardiac Monitoring: None  Code Status:  Full code, discussed with patient    Clinically Significant Risk Factors Present on Admission                # Drug Induced Platelet Defect: home medication list includes an antiplatelet medication   # Hypertension: Noted on problem list      # Obesity: Estimated body mass index is 31.57 kg/m  as calculated from the following:    Height as of this encounter: 1.778 m (5' 10\").    Weight as of this encounter: 99.8 kg (220 lb).              Disposition Plan           Mirtha Pinzon MD  Hospitalist Service  [facility]  Securely message with [employer] (more info)  Text page via [facility] Paging/Directory     ______________________________________________________________________    Chief Complaint   Right-sided numbness and weakness, difficulty with speech    History is obtained from the patient, electronic health record, and emergency department physician    History of Present Illness   Chucho Ac is a 68 year old male with history of insulin-dependent diabetes mellitus, hypertension and hyperlipidemia who presents with right-sided numbness and weakness and difficulty speaking.  Patient reports that he initially noticed numbness in his right fingers with subsequent heaviness on his right leg.  He did have some " weakness on the right leg and had to drag the foot.  He also noticed some slurring us of speech which has since improved.  He denies any difficulty swallowing though.  His son came to visit today and noticed the symptoms and brought him to the ED.    The patient reports that for about a month he has not been taking his insulin and his antihypertensives, which he reports is because he has been unable to measure his sugars or his blood pressure and not sure how they are doing.      Past Medical History    Past Medical History:   Diagnosis Date    HTN (hypertension)     Hypertriglyceridemia        Past Surgical History   No past surgical history on file.    Prior to Admission Medications   Prior to Admission Medications   Prescriptions Last Dose Informant Patient Reported? Taking?   Blood Pressure KIT   No No   Sig: Check BP daily   aspirin 81 MG tablet   Yes No   Sig: Take 1 tablet by mouth daily.   fenofibrate 160 MG tablet   No No   Sig: Take 1 tablet by mouth daily.   lisinopril-hydrochlorothiazide (PRINZIDE,ZESTORETIC) 10-12.5 MG per tablet   No No   Sig: Take 1 tablet by mouth daily.      Facility-Administered Medications: None        Review of Systems    The 10 point Review of Systems is negative other than noted in the HPI or here.      Physical Exam   Vital Signs: Temp: 98  F (36.7  C) Temp src: Oral BP: (!) 175/120 Pulse: 78   Resp: 18 SpO2: 96 % O2 Device: None (Room air)    Weight: 220 lbs 0 oz    Exam:  Constitutional: Awake, alert and no distress. Appears comfortable  Head: Normocephalic. No masses, lesions, tenderness or abnormalities  ENMT: ENT exam normal, no neck nodes or sinus tenderness  Cardiovascular: RRR.  2+ murmurs, no rubs or JVD  Respiratory:normal WOB,b/l equal air entry, no wheezes or crackles   Gastrointestinal: Abdomen soft, non-tender. BS normal. No masses, organomegaly  : Deferred  Extremities :no edema , no clubbing or cyanosis    Neurologic: Cranial nerves II-XII grossly intact ,  power decreased on right upper and lower extremity compared to left , Reflexes normal and symmetric. Sensation decreased on right side     Medical Decision Making       80 MINUTES SPENT BY ME on the date of service doing chart review, history, exam, documentation & further activities per the note.      Data     I have personally reviewed the following data over the past 24 hrs:    3.0 (L)  \   12.4 (L)   / 123 (L)     138 100 18.3 /  263 (H)   3.7 22 1.23 (H) \     Trop: 76 (H) BNP: N/A     INR:  1.07 PTT:  34   D-dimer:  N/A Fibrinogen:  N/A       Imaging results reviewed over the past 24 hrs:   Recent Results (from the past 24 hour(s))   MRA Brain ([place]) wo Contrast    Narrative    EXAM: MR BRAIN W/O and W CONTRAST, MRA NECK (CAROTIDS) W/O and W CONTRAST, MRA BRAIN ([place]) W/O CONTRAST  LOCATION: [facility]  DATE: 4/7/2024    INDICATION: Acute neuro deficit, stroke suspected  COMPARISON: None.  CONTRAST: 10 mL Gadavist  TECHNIQUE:   1) Routine multiplanar multisequence head MRI without and with intravenous contrast.  2) 3D time-of-flight head MRA without intravenous contrast.  3) Neck MRA without and with IV contrast. Stenosis measurements made according to NASCET criteria unless otherwise specified.    FINDINGS:  HEAD MRI:  INTRACRANIAL CONTENTS: Focal restricted diffusion at the left central maxine is present consistent with acute lacunar infarct measuring 18 x 6 mm. There are 2 additional patchy areas of restricted diffusion at the left frontoparietal corona radiata   representing acute lacunar infarcts measuring 9 and 6 mm respectively. No mass, acute hemorrhage, or extra-axial fluid collections. Patchy and confluent nonspecific T2/FLAIR hyperintensities within the cerebral white matter and maxine most consistent with   moderate chronic microvascular ischemic change. There is chronic lacunar infarct of the left cerebellar hemisphere and left basal ganglia and right  thalamus. There are a few scattered punctate foci of susceptibility artifact favored represent chronic   hypertensive microhemorrhages. Mild generalized cerebral atrophy. No hydrocephalus. Normal position of the cerebellar tonsils. No pathologic contrast enhancement.    SELLA: No abnormality accounting for technique.    OSSEOUS STRUCTURES/SOFT TISSUES: Normal marrow signal. The major intracranial vascular flow voids are maintained.     ORBITS: No abnormality accounting for technique.     SINUSES/MASTOIDS: No paranasal sinus mucosal disease. No middle ear or mastoid effusion.     HEAD MRA:   ANTERIOR CIRCULATION: No stenosis/occlusion, aneurysm, or high flow vascular malformation. Standard [place] anatomy.    POSTERIOR CIRCULATION: No stenosis/occlusion, aneurysm, or high flow vascular malformation. Balanced vertebral arteries supply a normal basilar artery.     NECK MRA:   RIGHT CAROTID: No measurable stenosis or dissection.    LEFT CAROTID: No measurable stenosis or dissection.    VERTEBRAL ARTERIES: No focal stenosis or dissection. Balanced vertebral arteries.    AORTIC ARCH: Classic aortic arch anatomy with no significant stenosis at the origin of the great vessels.      Impression    IMPRESSION:  HEAD MRI:  1.  Acute lacunar infarcts at the left central maxine and left frontoparietal corona radiata. No associated hemorrhage.  2.  Moderate chronic small vessel ischemic change.    HEAD MRA:  Normal MRA [place].    NECK MRA:  Normal neck MRA.     MRA Neck (Carotids) wo & w Contrast    Narrative    EXAM: MR BRAIN W/O and W CONTRAST, MRA NECK (CAROTIDS) W/O and W CONTRAST, MRA BRAIN ([place]) W/O CONTRAST  LOCATION: [facility]  DATE: 4/7/2024    INDICATION: Acute neuro deficit, stroke suspected  COMPARISON: None.  CONTRAST: 10 mL Gadavist  TECHNIQUE:   1) Routine multiplanar multisequence head MRI without and with intravenous contrast.  2) 3D time-of-flight head MRA  without intravenous contrast.  3) Neck MRA without and with IV contrast. Stenosis measurements made according to NASCET criteria unless otherwise specified.    FINDINGS:  HEAD MRI:  INTRACRANIAL CONTENTS: Focal restricted diffusion at the left central maxine is present consistent with acute lacunar infarct measuring 18 x 6 mm. There are 2 additional patchy areas of restricted diffusion at the left frontoparietal corona radiata   representing acute lacunar infarcts measuring 9 and 6 mm respectively. No mass, acute hemorrhage, or extra-axial fluid collections. Patchy and confluent nonspecific T2/FLAIR hyperintensities within the cerebral white matter and maxine most consistent with   moderate chronic microvascular ischemic change. There is chronic lacunar infarct of the left cerebellar hemisphere and left basal ganglia and right thalamus. There are a few scattered punctate foci of susceptibility artifact favored represent chronic   hypertensive microhemorrhages. Mild generalized cerebral atrophy. No hydrocephalus. Normal position of the cerebellar tonsils. No pathologic contrast enhancement.    SELLA: No abnormality accounting for technique.    OSSEOUS STRUCTURES/SOFT TISSUES: Normal marrow signal. The major intracranial vascular flow voids are maintained.     ORBITS: No abnormality accounting for technique.     SINUSES/MASTOIDS: No paranasal sinus mucosal disease. No middle ear or mastoid effusion.     HEAD MRA:   ANTERIOR CIRCULATION: No stenosis/occlusion, aneurysm, or high flow vascular malformation. Standard [place] of [place] anatomy.    POSTERIOR CIRCULATION: No stenosis/occlusion, aneurysm, or high flow vascular malformation. Balanced vertebral arteries supply a normal basilar artery.     NECK MRA:   RIGHT CAROTID: No measurable stenosis or dissection.    LEFT CAROTID: No measurable stenosis or dissection.    VERTEBRAL ARTERIES: No focal stenosis or dissection. Balanced vertebral arteries.    AORTIC ARCH: Classic  aortic arch anatomy with no significant stenosis at the origin of the great vessels.      Impression    IMPRESSION:  HEAD MRI:  1.  Acute lacunar infarcts at the left central maxine and left frontoparietal corona radiata. No associated hemorrhage.  2.  Moderate chronic small vessel ischemic change.    HEAD MRA:  Normal MRA [place].    NECK MRA:  Normal neck MRA.     MR Brain w/o & w Contrast    Narrative    EXAM: MR BRAIN W/O and W CONTRAST, MRA NECK (CAROTIDS) W/O and W CONTRAST, MRA BRAIN ([place]) W/O CONTRAST  LOCATION: [facility]  DATE: 4/7/2024    INDICATION: Acute neuro deficit, stroke suspected  COMPARISON: None.  CONTRAST: 10 mL Gadavist  TECHNIQUE:   1) Routine multiplanar multisequence head MRI without and with intravenous contrast.  2) 3D time-of-flight head MRA without intravenous contrast.  3) Neck MRA without and with IV contrast. Stenosis measurements made according to NASCET criteria unless otherwise specified.    FINDINGS:  HEAD MRI:  INTRACRANIAL CONTENTS: Focal restricted diffusion at the left central maxine is present consistent with acute lacunar infarct measuring 18 x 6 mm. There are 2 additional patchy areas of restricted diffusion at the left frontoparietal corona radiata   representing acute lacunar infarcts measuring 9 and 6 mm respectively. No mass, acute hemorrhage, or extra-axial fluid collections. Patchy and confluent nonspecific T2/FLAIR hyperintensities within the cerebral white matter and maxine most consistent with   moderate chronic microvascular ischemic change. There is chronic lacunar infarct of the left cerebellar hemisphere and left basal ganglia and right thalamus. There are a few scattered punctate foci of susceptibility artifact favored represent chronic   hypertensive microhemorrhages. Mild generalized cerebral atrophy. No hydrocephalus. Normal position of the cerebellar tonsils. No pathologic contrast enhancement.    SELLA: No  abnormality accounting for technique.    OSSEOUS STRUCTURES/SOFT TISSUES: Normal marrow signal. The major intracranial vascular flow voids are maintained.     ORBITS: No abnormality accounting for technique.     SINUSES/MASTOIDS: No paranasal sinus mucosal disease. No middle ear or mastoid effusion.     HEAD MRA:   ANTERIOR CIRCULATION: No stenosis/occlusion, aneurysm, or high flow vascular malformation. Standard [place] of [place] anatomy.    POSTERIOR CIRCULATION: No stenosis/occlusion, aneurysm, or high flow vascular malformation. Balanced vertebral arteries supply a normal basilar artery.     NECK MRA:   RIGHT CAROTID: No measurable stenosis or dissection.    LEFT CAROTID: No measurable stenosis or dissection.    VERTEBRAL ARTERIES: No focal stenosis or dissection. Balanced vertebral arteries.    AORTIC ARCH: Classic aortic arch anatomy with no significant stenosis at the origin of the great vessels.      Impression    IMPRESSION:  HEAD MRI:  1.  Acute lacunar infarcts at the left central maxine and left frontoparietal corona radiata. No associated hemorrhage.  2.  Moderate chronic small vessel ischemic change.    HEAD MRA:  Normal MRA [place].    NECK MRA:  Normal neck MRA.       Recent Labs   Lab 04/07/24  1554 04/07/24  1545   WBC 3.0*  --    HGB 12.4*  --    MCV 81  --    *  --    INR 1.07  --      --    POTASSIUM 3.7  --    CHLORIDE 100  --    CO2 22  --    BUN 18.3  --    CR 1.23*  --    ANIONGAP 16*  --    LEN 9.1  --    * 262*

## 2024-04-08 NOTE — PROVIDER NOTIFICATION
MD Notification    Notified Person: MD    Notified Person Name: Hilary    Notification Date/Time: April 8, 2024 at 1656    Notification Interaction:  vocera message    Purpose of Notification:BP now 182/122, please advise    Orders Received: ordered amlodipine     Comments:

## 2024-04-08 NOTE — PROGRESS NOTES
[facility]    Medicine Progress Note - Hospitalist Service    Date of Admission:  4/7/2024    Assessment & Plan   Chucho Ac is a 68 year old male with history of insulin-dependent diabetes mellitus, hypertension and hyperlipidemia who presents with right-sided numbness and weakness and difficulty speaking.     Acute lacunar infarct involving the left central maxine and left frontoparietal corona radiata  -Out of window for intervention as his symptoms started 3 days prior to presentation  -MRI brain shows acute lacunar infarct of the left central maxine and left frontoparietal corona radiata with no associated hemorrhage and moderate chronic small vessel ischemic changes  -Head and neck MRA with no significant stenosis/occlusion  -Admit in telemetry, neurochecks q. 4 hour, neurology consultation  -Will start patient on full dose aspirin until at least evaluated by neurology, further antiplatelet recommendation per neurology  -Echo ordered  - Allow permissive hypertension, euvolemia, euthermia  Hemoglobin A1c checked returned at 7.3%  LDL elevated at 119.  Started on Lipitor 80 mg p.o. daily by neurology  CT angiogram heart ordered by neurology    PT OT evaluations, echo results pending  Patient is currently on moderate carb controlled diet    Hypertension  Type 2 diabetes mellitus reasonably well-controlled with hemoglobin A1c of 7.3%  Hyperlipidemia  Medical noncompliance  -Patient does report to history of hypertension hyperlipidemia and insulin-dependent diabetes mellitus.  However reports that he has not been taking his medication including insulin for more than a month now given he is unable to monitor his blood sugars or blood pressure  -Currently allowing permissive hypertension, as needed antihypertensives as per stroke protocol  -Blood sugar on arrival was 263, hemoglobin A1c at 7.3%  -Start with high resistance sliding scale insulin monitor and adjust medication as  "needed  Fasting LPA showed total cholesterol 181, triglycerides 199, , HDL low at 28  Patient was started on Lipitor 80 mg p.o. daily per neurology     Elevated troponin  -Patient denies any chest pain.  Troponin at presentation 74, repeat 76  -EKG with T wave inversion in lateral leads but no previous EKG to compare  -Could be demand ischemia from acute stroke/hypertension as above  -Being monitored on  telemetry as above  -Echocardiogram for stroke workup as above     Anemia, mild  Leukopenia, mild resolved  Thrombocytopenia, mild  -No baseline to compare  -Monitor and if remains stable outpatient follow-up  Hemoglobin is stable from 12.4-12.9     Possible CKD stage II  -Previous labs from 2011 shows creatinine at baseline 1.1-1.2.  -Creatinine at presentation 1.23, monitor, avoid nephrotoxins  Creatinine improved to 1.02 on 4/8/2024        Diet:  Moderate carb control diet   DVT Prophylaxis: Pneumatic Compression Devices  [place] Catheter: Not present  Lines: None     Cardiac Monitoring: None  Code Status:  Full code, discussed with patient              Clinically Significant Risk Factors Present on Admission                # Thrombocytopenia: Lowest platelets = 123 in last 2 days, will monitor for bleeding   # Hypertension: Noted on problem list     # DMII: A1C = 7.3 % (Ref range: <5.7 %) within past 6 months    # Obesity: Estimated body mass index is 31.57 kg/m  as calculated from the following:    Height as of this encounter: 1.778 m (5' 10\").    Weight as of this encounter: 99.8 kg (220 lb).              Disposition Plan      Expected Discharge Date: 04/09/2024                Discussed with bedside RN, patient and his daughter at bedside    Saskia Richard MD  Hospitalist Service  [facility]  Securely message with Paulie (more info)  Text page via [facility] Paging/Directory   ______________________________________________________________________    Interval History   Chart reviewed, " patient seen this morning    Resting comfortably in chair.  Difficulty speaking resolved.  Right-sided heaviness also improved.  Discussed with him that MRI showed small lacunar stroke in the left central maxine and left frontoparietal corona radiator.  He is currently on regular aspirin.  Cholesterol medication started by neurology as LDL is 119.  PT OT and echo evaluations are currently pending.  Will allow permissive hypertension today before starting blood pressure meds prior to discharge.  No other acute issues today    Physical Exam   Vital Signs: Temp: 97.6  F (36.4  C) Temp src: Oral BP: (!) 176/115 Pulse: 69   Resp: 18 SpO2: 96 % O2 Device: None (Room air)    Weight: 220 lbs 0 oz    General Appearance: Alert awake, not in acute distress  Respiratory: Clear to auscultation bilaterally  Cardiovascular: Normal rate rhythm regular  GI: Soft, nontender nondistended bowel sounds positive  Skin: Warm and dry  Other:      Medical Decision Making       52 MINUTES SPENT BY ME on the date of service doing chart review, history, exam, documentation & further activities per the note.      Data     I have personally reviewed the following data over the past 24 hrs:    4.2  \   12.9 (L)   / 138 (L)     137 98 12.7 /  212 (H)   4.2 26 1.02 \     Trop: 80 (H) BNP: N/A     TSH: 2.22 T4: N/A A1C: 7.3 (H)     INR:  1.07 PTT:  34   D-dimer:  N/A Fibrinogen:  N/A       Imaging results reviewed over the past 24 hrs:   Recent Results (from the past 24 hour(s))   MRA Brain ([place] of [place]) wo Contrast    Narrative    EXAM: MR BRAIN W/O and W CONTRAST, MRA NECK (CAROTIDS) W/O and W CONTRAST, MRA BRAIN ([place] OF [place]) W/O CONTRAST  LOCATION: [facility]  DATE: 4/7/2024    INDICATION: Acute neuro deficit, stroke suspected  COMPARISON: None.  CONTRAST: 10 mL Gadavist  TECHNIQUE:   1) Routine multiplanar multisequence head MRI without and with intravenous contrast.  2) 3D time-of-flight head MRA without  intravenous contrast.  3) Neck MRA without and with IV contrast. Stenosis measurements made according to NASCET criteria unless otherwise specified.    FINDINGS:  HEAD MRI:  INTRACRANIAL CONTENTS: Focal restricted diffusion at the left central maxine is present consistent with acute lacunar infarct measuring 18 x 6 mm. There are 2 additional patchy areas of restricted diffusion at the left frontoparietal corona radiata   representing acute lacunar infarcts measuring 9 and 6 mm respectively. No mass, acute hemorrhage, or extra-axial fluid collections. Patchy and confluent nonspecific T2/FLAIR hyperintensities within the cerebral white matter and maxine most consistent with   moderate chronic microvascular ischemic change. There is chronic lacunar infarct of the left cerebellar hemisphere and left basal ganglia and right thalamus. There are a few scattered punctate foci of susceptibility artifact favored represent chronic   hypertensive microhemorrhages. Mild generalized cerebral atrophy. No hydrocephalus. Normal position of the cerebellar tonsils. No pathologic contrast enhancement.    SELLA: No abnormality accounting for technique.    OSSEOUS STRUCTURES/SOFT TISSUES: Normal marrow signal. The major intracranial vascular flow voids are maintained.     ORBITS: No abnormality accounting for technique.     SINUSES/MASTOIDS: No paranasal sinus mucosal disease. No middle ear or mastoid effusion.     HEAD MRA:   ANTERIOR CIRCULATION: No stenosis/occlusion, aneurysm, or high flow vascular malformation. Standard [place] of [place] anatomy.    POSTERIOR CIRCULATION: No stenosis/occlusion, aneurysm, or high flow vascular malformation. Balanced vertebral arteries supply a normal basilar artery.     NECK MRA:   RIGHT CAROTID: No measurable stenosis or dissection.    LEFT CAROTID: No measurable stenosis or dissection.    VERTEBRAL ARTERIES: No focal stenosis or dissection. Balanced vertebral arteries.    AORTIC ARCH: Classic aortic  arch anatomy with no significant stenosis at the origin of the great vessels.      Impression    IMPRESSION:  HEAD MRI:  1.  Acute lacunar infarcts at the left central maxine and left frontoparietal corona radiata. No associated hemorrhage.  2.  Moderate chronic small vessel ischemic change.    HEAD MRA:  Normal MRA [place] of [place].    NECK MRA:  Normal neck MRA.     MRA Neck (Carotids) wo & w Contrast    Narrative    EXAM: MR BRAIN W/O and W CONTRAST, MRA NECK (CAROTIDS) W/O and W CONTRAST, MRA BRAIN ([place] OF [place]) W/O CONTRAST  LOCATION: [facility]  DATE: 4/7/2024    INDICATION: Acute neuro deficit, stroke suspected  COMPARISON: None.  CONTRAST: 10 mL Gadavist  TECHNIQUE:   1) Routine multiplanar multisequence head MRI without and with intravenous contrast.  2) 3D time-of-flight head MRA without intravenous contrast.  3) Neck MRA without and with IV contrast. Stenosis measurements made according to NASCET criteria unless otherwise specified.    FINDINGS:  HEAD MRI:  INTRACRANIAL CONTENTS: Focal restricted diffusion at the left central maxine is present consistent with acute lacunar infarct measuring 18 x 6 mm. There are 2 additional patchy areas of restricted diffusion at the left frontoparietal corona radiata   representing acute lacunar infarcts measuring 9 and 6 mm respectively. No mass, acute hemorrhage, or extra-axial fluid collections. Patchy and confluent nonspecific T2/FLAIR hyperintensities within the cerebral white matter and maxine most consistent with   moderate chronic microvascular ischemic change. There is chronic lacunar infarct of the left cerebellar hemisphere and left basal ganglia and right thalamus. There are a few scattered punctate foci of susceptibility artifact favored represent chronic   hypertensive microhemorrhages. Mild generalized cerebral atrophy. No hydrocephalus. Normal position of the cerebellar tonsils. No pathologic contrast enhancement.    SELLA: No  abnormality accounting for technique.    OSSEOUS STRUCTURES/SOFT TISSUES: Normal marrow signal. The major intracranial vascular flow voids are maintained.     ORBITS: No abnormality accounting for technique.     SINUSES/MASTOIDS: No paranasal sinus mucosal disease. No middle ear or mastoid effusion.     HEAD MRA:   ANTERIOR CIRCULATION: No stenosis/occlusion, aneurysm, or high flow vascular malformation. Standard [place] of [place] anatomy.    POSTERIOR CIRCULATION: No stenosis/occlusion, aneurysm, or high flow vascular malformation. Balanced vertebral arteries supply a normal basilar artery.     NECK MRA:   RIGHT CAROTID: No measurable stenosis or dissection.    LEFT CAROTID: No measurable stenosis or dissection.    VERTEBRAL ARTERIES: No focal stenosis or dissection. Balanced vertebral arteries.    AORTIC ARCH: Classic aortic arch anatomy with no significant stenosis at the origin of the great vessels.      Impression    IMPRESSION:  HEAD MRI:  1.  Acute lacunar infarcts at the left central maxine and left frontoparietal corona radiata. No associated hemorrhage.  2.  Moderate chronic small vessel ischemic change.    HEAD MRA:  Normal MRA [place] of [place].    NECK MRA:  Normal neck MRA.     MR Brain w/o & w Contrast    Narrative    EXAM: MR BRAIN W/O and W CONTRAST, MRA NECK (CAROTIDS) W/O and W CONTRAST, MRA BRAIN ([place] OF [place]) W/O CONTRAST  LOCATION: [facility]  DATE: 4/7/2024    INDICATION: Acute neuro deficit, stroke suspected  COMPARISON: None.  CONTRAST: 10 mL Gadavist  TECHNIQUE:   1) Routine multiplanar multisequence head MRI without and with intravenous contrast.  2) 3D time-of-flight head MRA without intravenous contrast.  3) Neck MRA without and with IV contrast. Stenosis measurements made according to NASCET criteria unless otherwise specified.    FINDINGS:  HEAD MRI:  INTRACRANIAL CONTENTS: Focal restricted diffusion at the left central maxine is present consistent with acute  lacunar infarct measuring 18 x 6 mm. There are 2 additional patchy areas of restricted diffusion at the left frontoparietal corona radiata   representing acute lacunar infarcts measuring 9 and 6 mm respectively. No mass, acute hemorrhage, or extra-axial fluid collections. Patchy and confluent nonspecific T2/FLAIR hyperintensities within the cerebral white matter and maxine most consistent with   moderate chronic microvascular ischemic change. There is chronic lacunar infarct of the left cerebellar hemisphere and left basal ganglia and right thalamus. There are a few scattered punctate foci of susceptibility artifact favored represent chronic   hypertensive microhemorrhages. Mild generalized cerebral atrophy. No hydrocephalus. Normal position of the cerebellar tonsils. No pathologic contrast enhancement.    SELLA: No abnormality accounting for technique.    OSSEOUS STRUCTURES/SOFT TISSUES: Normal marrow signal. The major intracranial vascular flow voids are maintained.     ORBITS: No abnormality accounting for technique.     SINUSES/MASTOIDS: No paranasal sinus mucosal disease. No middle ear or mastoid effusion.     HEAD MRA:   ANTERIOR CIRCULATION: No stenosis/occlusion, aneurysm, or high flow vascular malformation. Standard [place] of [place] anatomy.    POSTERIOR CIRCULATION: No stenosis/occlusion, aneurysm, or high flow vascular malformation. Balanced vertebral arteries supply a normal basilar artery.     NECK MRA:   RIGHT CAROTID: No measurable stenosis or dissection.    LEFT CAROTID: No measurable stenosis or dissection.    VERTEBRAL ARTERIES: No focal stenosis or dissection. Balanced vertebral arteries.    AORTIC ARCH: Classic aortic arch anatomy with no significant stenosis at the origin of the great vessels.      Impression    IMPRESSION:  HEAD MRI:  1.  Acute lacunar infarcts at the left central maxine and left frontoparietal corona radiata. No associated hemorrhage.  2.  Moderate chronic small vessel ischemic  change.    HEAD MRA:  Normal MRA [place] of [place].    NECK MRA:  Normal neck MRA.

## 2024-04-08 NOTE — PHARMACY-ADMISSION MEDICATION HISTORY
Pharmacist Admission Medication History    Admission medication history is complete. The information provided in this note is only as accurate as the sources available at the time of the update.    Information Source(s): Patient and CareEverywhere/SureScripts via in-person    Pertinent Information: Patient reports he has not taking lisinopril-hydrochlorothiazide, fenofibrate or aspirin in years. He reports he is not currently taking any medications but used to take insulin 24 units at bedtime (he was unable to specify what type of insulin he used). He reports he weaned himself off of the insulin a month ago    Changes made to PTA medication list:  Added: None  Deleted: lisinopril-hydrochlorothiazide 10-12.5, fenofibrate 160, aspirin 81  Changed: None    Allergies reviewed with patient and updates made in EHR: yes    Medication History Completed By: Jacqui Smith RPH 4/7/2024 7:45 PM    No outpatient medications have been marked as taking for the 4/7/24 encounter (Hospital Encounter).

## 2024-04-08 NOTE — PROVIDER NOTIFICATION
MD Notification    Notified Person: MD    Notified Person Name: BERNICE Pulido     Notification Date/Time: April 8, 2024 at 0856    Notification Interaction: Vocera message     Purpose of Notification: Pt /115. Has BP meds for diastolic less than 110. Wanted to clarify that this is what neuro team wanted. Thank you.     Orders Received:    Comments:

## 2024-04-08 NOTE — PLAN OF CARE
Pt here with left CVA. A&Ox4. Neuros slight RUE/RLE weakness 4/5, denied numbness to RUE overnight. Passed bedside swallow eval. VSS on RA with permissive hypertension. MCHO diet, thin liquids. Takes pills whole. Up with SBA. Denies pain. Pt scoring green on the Aggression Stop Light Tool. Plan for stroke workup. Discharge pending.

## 2024-04-08 NOTE — PROGRESS NOTES
ADDENDUM 3:15pm Note: OT notified SLP that pt's daughter reported pt's speech is not 100% back to baseline with slight slurred noted by her even though pt states his speech is at baseline. Daughter was consulted.  She reports pt has demonstrated no word finding difficulty only slight imprecise articulation.  Daughter was provided information regarding OP SLP eval and Tx services if increased speech-language concerns arise after discharge.  Education was also provided regarding OP VFSS completion if increased swallowing difficulty noted.  Daughter verbalized understanding of education and no further IP needs expressed.      SLP Bedside Swallow Evaluation  04/08/24 0904   Appointment Info   Signing Clinician's Name / Credentials (SLP) Kellie Mora MA [facility] SLP   General Information   Onset of Illness/Injury or Date of Surgery 04/07/24   Referring Physician Dr. Pinzon   Patient/Family Therapy Goal Statement (SLP) Did not state   Pertinent History of Current Problem Admit with L maxine and frontoparietal corona radiata CVA   General Observations SLP bedside swallow evaluation completed given maxine involvement on MRI.  Pt was alert and cooperative.  Pt reports his slurred speech and other neuro symptoms have resolved back to baseline.  Pt reports good tolerance of thin liquids.  VSS on RA   Type of Evaluation   Type of Evaluation Swallow Evaluation   Oral Motor   Oral Musculature generally intact  (Slight oral apraxia noted, ,min-no asymmetry)   Dentition (Oral Motor)   Dentition (Oral Motor) adequate dentition   Cough/Swallow/Gag Reflex (Oral Motor)   Comment, Cough/Swallow/Gag Reflex (Oral Motor) Intact   Vocal Quality/Secretion Management (Oral Motor)   Comment, Vocal Quality/Secretion Management (Oral Motor) WFL   General Swallowing Observations   Past History of Dysphagia None stated   Respiratory Support room air   Current Diet/Method of Nutritional Intake (General Swallowing Observations, NIS) regular diet;thin  liquids (level 0)   Swallowing Evaluation Clinical swallow evaluation   Clinical Swallow Evaluation   Feeding Assistance no assistance needed   Clinical Swallow Evaluation Textures Trialed thin liquids;solid foods   Clinical Swallow Eval: Thin Liquid Texture Trial   Mode of Presentation, Thin Liquids cup;straw   Volume of Liquid or Food Presented sip by cup x 1, sips by straw x 4   Oral Phase of Swallow WFL   Pharyngeal Phase of Swallow intact   Diagnostic Statement no aspiration signs   Clinical Swallow Evaluation: Solid Food Texture Trial   Mode of Presentation self-fed   Volume Presented 3 whole crackers   Oral Phase WFL   Pharyngeal Phase intact   Diagnostic Statement no aspiration signs; pt took large bites - thin used after large bites to insure bolus clearance   Swallowing Recommendations   Diet Consistency Recommendations regular diet;thin liquids (level 0)  (Alternate textures, monitor for aspiration signs)   Clinical Impression   Criteria for Skilled Therapeutic Interventions Met (SLP Eval) Evaluation only   SLP Diagnosis WFL   Clinical Impression Comments Swallow was found to be WFL.  Findings included slight oral apraxia and impulsive large bites of food.  No aspiration signs were noted across trials.  Pt feels his speech is back to baseline.  Recommend continued regular diet textures and thin liquids with small bites/sips and alternating textures.  No swallow Tx is indicated at this time.  Eduation was provided to pt to monitor for increased swallowing difficulty, aspiration signs with po intake, and follow up with MD for OP VFSS orders if increased concerns arise.   Pt verbalized understanding.   SLP Total Evaluation Time   Eval: oral/pharyngeal swallow function, clinical swallow Minutes (70273) 10   SLP Discharge Planning   SLP Plan Discontinue SLP Tx   SLP Discharge Recommendation home   SLP Rationale for DC Rec Swallow and speech function appear to be at baseline; OP VFSS if increased swallow  concerns arise   SLP Brief overview of current status  Swallow is WFL; continue a regular diet   Total Session Time   Total Session Time (sum of timed and untimed services) 10

## 2024-04-08 NOTE — PROGRESS NOTES
04/08/24 1600   Appointment Info   Signing Clinician's Name / Credentials (PT) Janny Agrawal, PT, DPT   Living Environment   People in Home alone   Current Living Arrangements apartment   Home Accessibility stairs to enter home   Transportation Anticipated car, drives self;family or friend will provide   Living Environment Comments Patient reports he lives alone but does have family living nearby.   Self-Care   Usual Activity Tolerance good   Current Activity Tolerance good   Regular Exercise No   Equipment Currently Used at Home none   Fall history within last six months no   Activity/Exercise/Self-Care Comment Patient reports baseline independence with dressing, bathing, and toilet.  He was independent for mobility.  Denies falls.   General Information   Onset of Illness/Injury or Date of Surgery 04/07/24   Referring Physician Mirtha Pinzon MD   Patient/Family Therapy Goals Statement (PT) Patient anticipates returning home at discharge.   Pertinent History of Current Problem (include personal factors and/or comorbidities that impact the POC) 68 year old male with history of insulin-dependent diabetes mellitus, hypertension and hyperlipidemia who presents with right-sided numbness and weakness and difficulty speaking.   Existing Precautions/Restrictions fall   Cognition   Affect/Mental Status (Cognition) WFL   Orientation Status (Cognition) oriented x 3   Follows Commands (Cognition) WFL;delayed response/completion;increased processing time needed   Pain Assessment   Patient Currently in Pain No   Integumentary/Edema   Integumentary/Edema Comments Age-related skin changes   Posture    Posture Forward head position   Range of Motion (ROM)   Range of Motion ROM is WFL   Strength (Manual Muscle Testing)   Strength (Manual Muscle Testing) Able to perform R SLR;Able to perform L SLR;strength is WFL   Bed Mobility   Comment, (Bed Mobility) Mod I supine > sit transfer to L side of bed, HOB elevated.   Transfers    Comment, (Transfers) IND sit <> stand, braces hands against thighs.   Gait/Stairs (Locomotion)   Comment, (Gait/Stairs) SBA-CGA 10 ft ambulation without AD.   Balance   Balance Comments Impaired dynamic balance   Sensory Examination   Sensory Perception patient reports no sensory changes   Clinical Impression   Criteria for Skilled Therapeutic Intervention Yes, treatment indicated   PT Diagnosis (PT) Impaired functional mobility   Influenced by the following impairments Right-sided numbness and weakness with difficulty speaking, tightness R hamstring, impaired balance   Functional limitations due to impairments Impaired independence with bed mobility, ambulation, and stairs   Clinical Presentation (PT Evaluation Complexity) evolving   Clinical Presentation Rationale Clinical judgement, PMH, social support   Clinical Decision Making (Complexity) moderate complexity   Planned Therapy Interventions (PT) balance training;bed mobility training;gait training;home exercise program;neuromuscular re-education;patient/family education;postural re-education;stair training;strengthening;stretching;transfer training;progressive activity/exercise   Risk & Benefits of therapy have been explained evaluation/treatment results reviewed;care plan/treatment goals reviewed;risks/benefits reviewed;participants voiced agreement with care plan;participants included;patient   PT Total Evaluation Time   PT Eval, Moderate Complexity Minutes (71124) 10   Physical Therapy Goals   PT Frequency Daily   PT Predicted Duration/Target Date for Goal Attainment 04/10/24   PT Goals Bed Mobility;Transfers;Gait;Stairs   PT: Bed Mobility Independent;Supine to/from sit;Rolling   PT: Transfers Independent;Sit to/from stand;Bed to/from chair   PT: Gait Modified independent;150 feet;Assistive device   PT: Stairs Supervision/stand-by assist;6 stairs   Interventions   Interventions Quick Adds Therapeutic Activity;Gait Training   Therapeutic Activity    Therapeutic Activities: dynamic activities to improve functional performance Minutes (18618) 5   Symptoms Noted During/After Treatment None   Treatment Detail/Skilled Intervention Patient greeted supine in bed, no family present this afternoon but patient stating his daughter had visited.  Patient agreeable to PT session.  Supine > sit mod I with HOB elevated.  Maintains sitting balance IND.  Patient completing x5 repeated sit <> stands from bed for functional strengthening.  Patient bracing hands against thighs but performing transfers IND.  Patient seated in recliner chair at end of session, call light in reach.   Gait Training   Gait Training Minutes (09288) 16   Symptoms Noted During/After Treatment (Gait Training) fatigue   Treatment Detail/Skilled Intervention Patient ambulates total distance of ~425' without assistive device, needing, largely SBA-CGA but up to [place] with dynamic balance activities.  Patient ambulates slowly with difficulty maintaining straight path, often slight veering to left. Decreased right step length, reporting tightness in right hamstring.  Patient cued for horizontal and vertical head turns, 180 degrees turns/pivots, sudden stops, gait with eyes closed, retro gait, and tandem gait.  Patient most challenged by tandem gait, able to complete x3 steps before protective stepping for balance.  Patient able to retrieve x2 items from floor with SBA.  Patient negotiates 2 x 4 steps with step-over step pattern, CGA as patient tends to only position forefoot on step.  Intermittently reaching for rails while negotiating stairs.   Distance in Feet 425'   PT Discharge Planning   PT Plan Formal balance assessment, trial SPC, review stairs   PT Discharge Recommendation (DC Rec) home with assist;home with outpatient physical therapy   PT Rationale for DC Rec Patient presents slightly below his independent baseline, currently ambulating with SBA-CGA and CGA when stair climbing.  Patient stating he  lives alone but does have family that lives nearby.  Patient with no overt loss of balance but challenged by pivots, tandem gait, and stair climbing.  Patient with slight decrease in right step length, reporting stiffness in right hamstring.  Anticipate discharge home with assist from family and OP PT to address remaining dynamic balance deficits.  May benefit from use of AD to maximize gait stability.   PT Brief overview of current status Ax1   Total Session Time   Timed Code Treatment Minutes 21   Total Session Time (sum of timed and untimed services) 31

## 2024-04-08 NOTE — UTILIZATION REVIEW
Admission Status; Secondary Review Determination    Under the authority of the Utilization Management Committee, the utilization review process indicated a secondary review on the above patient. The review outcome is based on review of the medical records, discussions with staff, and applying clinical experience noted on the date of the review.    (x) Inpatient Status Appropriate - This patient's medical care is consistent with medical management for inpatient care and reasonable inpatient medical practice.    RATIONALE FOR DETERMINATION: 68-year-old male with history of insulin-dependent diabetes, hypertension, hyperlipidemia with new onset of difficulty speaking along with numbness in his right hand and right leg with some right leg weakness.  Imaging reveals an acute lacunar infarct of the left central maxine and left frontoparietal corona radiata along with moderate chronic small vessel ischemic changes.  Neurology concerned for cardiac embolic event and thus in addition to echocardiogram patient will undergo a CT coronary angiogram looking at heart structure along with therapies evaluation appropriate for inpatient management.    At the time of admission with the information available to the attending physician more than 2 nights Hospital complex care was anticipated, based on patient risk of adverse outcome if treated as outpatient and complex care required. Inpatient admission is appropriate based on the Medicare guidelines.    This document was produced using voice recognition software    The information on this document is developed by the utilization review team in order for the business office to ensure compliance. This only denotes the appropriateness of proper admission status and does not reflect the quality of care rendered.    The definitions of Inpatient Status and Observation Status used in making the determination above are those provided in the CMS Coverage Manual, Chapter 1 and Chapter 6,  section 70.4.    Sincerely,    Carlito Lara MD  Utilization Review  Physician Advisor  [facility].

## 2024-04-08 NOTE — PLAN OF CARE
"Goal Outcome Evaluation:      Plan of Care Reviewed With: patient, family               Reason for Admission: L CVA     Cognitive/Mentation: A/Ox 4  Neuros/CMS: Intact ex slurred speech, reported \"R leg feels heavy\" but is improving since admission per Pt and stroke team is aware. LUE/LLE 5/5, RUE 5/5, RLE 4/5. Slow and deliberate with heel to shin.   VS: BP (!) 149/102   Pulse 100   Temp 98.3  F (36.8  C) (Oral)   Resp 18   Ht 1.778 m (5' 10\")   Wt 99.8 kg (220 lb)   SpO2 97%   BMI 31.57 kg/m    .   Tele: NSR.  GI:  Bowel sounds active, Continent.  : voiding adequately, Continent.  Pulmonary: LS clear .  Pain: denied .     Drains/Lines: IV  Skin: dry BLE  Activity: Assist x 1 with gait belt.  Diet: mod carb with thin liquids. Takes pills whole.     Therapies recs: home with assist   Discharge: pending    Aggression Stoplight Tool: green    End of shift summary: Pt impulsive, bed and chair alarm on. Educated on importance of using call light. Good appetite. Had a CTA done this shift.     "

## 2024-04-08 NOTE — PROGRESS NOTES
04/08/24 1145   Appointment Info   Signing Clinician's Name / Credentials (OT) Leidy DanSAMY   Living Environment   People in Home alone   Current Living Arrangements apartment   Home Accessibility stairs to enter home   Number of Stairs, Main Entrance 6   Transportation Anticipated car, drives self   Self-Care   Usual Activity Tolerance good   Current Activity Tolerance moderate   Regular Exercise No   Equipment Currently Used at Home none   Activity/Exercise/Self-Care Comment Pt reports indep with functional mobility and self cares at baseline.   General Information   Onset of Illness/Injury or Date of Surgery 04/07/24   Referring Physician Mirtha Pinzon MD   Additional Occupational Profile Info/Pertinent History of Current Problem Chucho Ac is a 68 year old male with history of insulin-dependent diabetes mellitus, hypertension and hyperlipidemia who presents with right-sided numbness and weakness and difficulty speaking. Acute lacunar infarct involving the left central maxine and left frontoparietal corona radiata.   Existing Precautions/Restrictions fall   Cognitive Status Examination   Orientation Status orientation to person, place and time   Affect/Mental Status (Cognitive) WFL   Follows Commands follows one-step commands;over 90% accuracy   Visual Perception   Visual Impairment/Limitations WFL   Impact of Vision Impairment on Function (Vision) Intact tracking, saccades, convergence, and acuity   Sensory   Sensory Quick Adds sensation intact   Posture   Posture protracted shoulders   Range of Motion Comprehensive   General Range of Motion no range of motion deficits identified;bilateral upper extremity ROM WFL   Strength Comprehensive (MMT)   General Manual Muscle Testing (MMT) Assessment no strength deficits identified   Comment, General Manual Muscle Testing (MMT) Assessment B UE 5/5, R LE hamstring 4/5, remaining BL Es 5/5   Coordination   Upper Extremity Coordination No deficits were  "identified   Transfers   Transfers sit-stand transfer   Transfer Comments SBA   Balance   Balance Comments Pt able to march in place, tandem stance and stand on 1 foot with no loss of balance.   Activities of Daily Living   BADL Assessment/Intervention lower body dressing   Lower Body Dressing Assessment/Training   [place] Level (Lower Body Dressing) doff;don;socks;independent   Clinical Impression   Criteria for Skilled Therapeutic Interventions Met (OT) Yes, treatment indicated   OT Diagnosis impaired functional mobility   OT Problem List-Impairments impacting ADL problems related to;activity tolerance impaired;strength;mobility   Assessment of Occupational Performance 1-3 Performance Deficits   Identified Performance Deficits functional mobility   Planned Therapy Interventions (OT) transfer training;progressive activity/exercise   Clinical Decision Making Complexity (OT) problem focused assessment/low complexity   Risk & Benefits of therapy have been explained evaluation/treatment results reviewed;care plan/treatment goals reviewed   OT Total Evaluation Time   OT Eval, Low Complexity Minutes (71913) 10   OT Goals   Therapy Frequency (OT) One time eval and treatment   OT Predicted Duration/Target Date for Goal Attainment 04/08/24   OT Goals Lower Body Dressing;Toilet Transfer/Toileting;Home Management   OT: Lower Body Dressing Modified independent   OT: Toilet Transfer/Toileting Modified independent   OT: Home Management Modified independent;with light demand household tasks;ambulatory level   Interventions   Interventions Quick Adds Therapeutic Activity   Therapeutic Activities   Therapeutic Activity Minutes (07920) 10   Symptoms noted during/after treatment none   Treatment Detail/Skilled Intervention Pt up in chair with daughter present. Pt reports only symptom still remaining is his R leg \"feels heavy\". Minor weakness noted in R hamstring. PT to assess. Able to ambulate in hallway with SBA and no device, " slight limp noted and minor R foot drag. Pt demos ability to complete toilet tx and LB dressing with no assistance. Education provided of OP services if needs arise and home safety for fall prevention. No further OT intervention needed   OT Discharge Planning   OT Plan d/c   OT Discharge Recommendation (DC Rec) home   OT Rationale for DC Rec From an OT perspective, anticipate pt to be able to return home without any needs. PT balance assessment needed to address higher level balance in regards to R LE weakness.   OT Brief overview of current status SBA-mod I with mobility and no deivce. mod I for LB dressing and toilet tx   Total Session Time   Timed Code Treatment Minutes 10   Total Session Time (sum of timed and untimed services) 20       Occupational Therapy Discharge Summary    Reason for therapy discharge:    All goals and outcomes met, no further needs identified.    Progress towards therapy goal(s). See goals on [facility] in [facility] record for goal details.  Goals met    Therapy recommendation(s):    No further therapy is recommended.

## 2024-04-08 NOTE — PHARMACY-CONSULT NOTE
Pharmacy Consult to evaluate for medication related stroke core measures    Chucho Ac, 68 year old male admitted for acute ischemic stroke of left central maxine and left frontoparietal corona radiata  on 4/7/2024.    Thrombolytic was not given because of Time from onset contraindications    VTE Prophylaxis SCDs /PCDs placed on 4/7, as appropriate prior to end of hospital day 2.    Antithrombotic: aspirin started on 4/8, as appropriate by end of hospital day 2. Continue antithrombotic therapy on discharge to meet quality measures, unless contraindicated.    Anticoagulation if history of A-fib/flutter: Patient does not have history of A-fib/flutter - anticoagulation not required for medication related stroke core measures.     LDL Cholesterol Calculated   Date Value Ref Range Status   04/07/2024 113 (H) <=100 mg/dL Final   12/02/2011 93 0 - 129 mg/dL Final     Comment:     LDL Cholesterol is the primary guide to therapy: LDL-cholesterol goal in high   risk patients is <100 mg/dL and in very high risk patients is <70 mg/dL.       Patient currently receiving Lipitor (atorvastatin) continue statin on discharge to meet quality measures, unless contraindicated.    Recommendations: None at this time    Thank you for the consult.  Rebekah Ramirez, PharmD

## 2024-04-08 NOTE — PROVIDER NOTIFICATION
MD Notification    Notified Person: MD    Notified Person Name: Hilary     Notification Date/Time: April 8, 2024 at 1520    Notification Interaction: vocera message     Purpose of Notification: Pt systolic bp 133, please advise. BP is 195/133.    Orders Received: ordered 10mg IV labetalol     Comments:

## 2024-04-08 NOTE — CONSULTS
This note written by the medical student has been reviewed and has been addended as needed. I agree with the assessment and plan.      Tea Caldera MD  Vascular Neurology Fellow      [facility]    Stroke Consult Note    Reason for Consult:  Right hand numbness and right leg weakness, dysarthria     Chief Complaint: Numbness (/)       HPI  Chucho Ac is a 68 year old male with history of insulin-dependent diabetes mellitus, hypertension and hyperlipidemia who presented on 4/7 with right-sided numbness and weakness and difficulty speaking. Per patient and patient's son, he reported that he initially noticed numbness in his right fingers with subsequent heaviness on his right leg three days prior to presentation. He states that he had to drag his right foot due to weakness of his right leg. He also noticed some slurring us of speech. His son came to visit and noticed the symptoms and brought him to the ED. He states that the slurred speech was improved by the time he got to the ED. He denies chest pain, shortness of breath, vision changes, feelings of unsteadiness, fever, chills, nausea, vomiting, diarrhea, cough, difficulty swallowing.     The patient reports that for about a month he has not been taking his insulin and his antihypertensives because he never refilled them after his initial prescription ran out.     Stroke Evaluation Summarized  MRI/Head CT 1.  Acute lacunar infarcts at the left central maxine and left frontoparietal corona radiata. No associated hemorrhage.  2.  Moderate chronic small vessel ischemic change.   Intracranial Vasculature Normal MRA [place].    Cervical Vasculature Normal neck MRA.      Echocardiogram Pending    EKG/Telemetry Unusual P axis, possible ectopic atrial tachycardia with occasional Premature ventricular complexes   ST & T wave abnormality, consider lateral ischemia   No previous ECGs available    Other Testing Not Applicable      LDL   "4/7/2024: 113 mg/dL   A1C  4/7/2024: 7.3 %   Troponin 4/8/2024: 80 ng/L     Impression  Acute ischemic stroke of left central maxine and left frontoparietal corona radiata most likely due to cardio embolism given ectopic atrial foci on EKG and location. Small vessel disease less likely to be contributory to this event given different stroke locations but need to be addressed for secondary prevention. No evidence of LVO on imaging.     Recommendations   - Neurochecks and Vital Signs every 4 hours   - Permissive HTN; goal SBP < 220 mmHg  - Daily aspirin 325 mg for secondary stroke prevention  - Statin: atorvastatin 80mg pending swallow  - Telemetry, EKG  - Echocardiogram   - Cardiac CT   - Bedside Glucose Monitoring  - A1c, Lipid Panel, Troponin x 3  - PT/OT/SLP  - Stroke Education  - Euthermia, Euglycemia    Patient Follow-up    - final recommendation pending work-up    Thank you for this consult. We will continue to follow.     The Stroke Fellow is Dr. Caldera. The Stroke Staff is Dr. Aviles.    Steve Ta  Medical Student  To page a member of the stroke/neurocritical care service, click here:  [facility]   Choose \"On Call\" tab at top, then search dropdown box for \"Neurology Adult\", select location, press Enter, then look for stroke/neuro ICU/telestroke.  _____________________________________________________       Past Medical History    Past Medical History:   Diagnosis Date     HTN (hypertension)      Hypertriglyceridemia      Medications   Home Meds  Prior to Admission medications    Medication Sig Start Date End Date Taking? Authorizing Provider   Blood Pressure KIT Check BP daily 11/18/11   Henry Matias MD       Scheduled Meds  Current Facility-Administered Medications   Medication Dose Route Frequency Provider Last Rate Last Admin     aspirin (ASA) EC tablet 325 mg  325 mg Oral Daily Mirtha Pinzon MD   325 mg at 04/08/24 0019    Or     aspirin (ASA) chewable tablet 324 mg  324 mg Oral or NG Tube Daily Jeromy, " MD Mirtha        Or     aspirin (ASA) Suppository 300 mg  300 mg Rectal Daily Mirtha Pinzon MD         insulin aspart (NovoLOG) injection (RAPID ACTING)  1-10 Units Subcutaneous TID AC Mirtha Pinzon MD         insulin aspart (NovoLOG) injection (RAPID ACTING)  1-7 Units Subcutaneous At Bedtime Mirtha Pinzon MD         sodium chloride (PF) 0.9% PF flush 3 mL  3 mL Intracatheter Q8H Mirtha Pinzon MD   3 mL at 04/08/24 0021       Infusion Meds  Current Facility-Administered Medications   Medication Dose Route Frequency Provider Last Rate Last Admin     medication instruction - No oral meds if patient didn't pass dysphagia screen   Does not apply Continuous PRN Mirtha Pinzon MD         Medication Instructions - Avoid dextrose in IV solutions.   Intravenous Continuous PRN Mirtha Pinzon MD           Allergies   No Known Allergies       PHYSICAL EXAMINATION   Temp:  [97.5  F (36.4  C)-98.6  F (37  C)] 98.5  F (36.9  C)  Pulse:  [] 97  Resp:  [16-18] 18  BP: (164-230)/() 185/117  SpO2:  [94 %-99 %] 96 %    General Exam  General:   sitting in chair, NAD     HEENT:  normocephalic/atraumatic  Pulmonary:  no respiratory distress  Abdomen:  non-distended  Extremities:  no edema  Skin:  intact, warm/dry     Neuro Exam  Mental Status:  alert, oriented x 3, follows commands, naming and repetition normal  Cranial Nerves:  visual fields intact, PERRL, EOMI with normal smooth pursuit, facial sensation intact and symmetric, facial movements symmetric, hearing not formally tested but intact to conversation, palate elevation symmetric and uvula midline, shoulder shrug strong bilaterally, tongue protrusion midline, mild dysarthria   Motor:  normal muscle tone and bulk, no abnormal movements, able to move all limbs spontaneously, no pronator drift, 5/5 strength LUE LLE, 5/5 strength RUE, 4/5 strength RLE  Sensory:  light touch sensation intact and symmetric throughout upper and lower extremities, no extinction on double  "simultaneous stimulation   Coordination:   moderate ataxia with RUE finger to nose and RLE heel to shin   Station/Gait:  deferred    Stroke Scales    NIHSS  1a. Level of Consciousness 0-->Alert, keenly responsive   1b. LOC Questions 0-->Answers both questions correctly   1c. LOC Commands 0-->Performs both tasks correctly   2.   Best Gaze 0-->Normal   3.   Visual 0-->No visual loss   4.   Facial Palsy 0-->Normal symmetrical movements   5a. Motor Arm, Left 0-->No drift, limb holds 90 (or 45) degrees for full 10 secs   5b. Motor Arm, Right 0-->No drift, limb holds 90 (or 45) degrees for full 10 secs   6a. Motor Leg, Left 0-->No drift, leg holds 30 degree position for full 5 secs   6b. Motor Leg, right 0-->No drift, leg holds 30 degree position for full 5 secs   7.   Limb Ataxia 2-->Present in two limbs   8.   Sensory 0-->Normal, no sensory loss   9.   Best Language 0-->No aphasia, normal   10. Dysarthria 1-->Mild-to-moderate dysarthria, patient slurs at least some words and, at worst, can be understood with some difficulty   11. Extinction and Inattention  0-->No abnormality   Total 3 (04/08/24 1551)       Imaging  I personally reviewed all imaging; relevant findings per HPI.    Labs Data   CBC  Recent Labs   Lab 04/07/24  1554   WBC 3.0*   RBC 4.57   HGB 12.4*   HCT 36.8*   *     Basic Metabolic Panel   Recent Labs   Lab 04/07/24  2257 04/07/24  1554 04/07/24  1545   NA  --  138  --    POTASSIUM  --  3.7  --    CHLORIDE  --  100  --    CO2  --  22  --    BUN  --  18.3  --    CR  --  1.23*  --    * 263* 262*   LEN  --  9.1  --      Liver Panel  No results for input(s): \"PROTTOTAL\", \"ALBUMIN\", \"BILITOTAL\", \"ALKPHOS\", \"AST\", \"ALT\", \"BILIDIRECT\" in the last 168 hours.  INR    Recent Labs   Lab Test 04/07/24  1554   INR 1.07             "

## 2024-04-09 ENCOUNTER — APPOINTMENT (OUTPATIENT)
Dept: PHYSICAL THERAPY | Facility: CLINIC | Age: 69
DRG: 065 | End: 2024-04-09
Payer: MEDICARE

## 2024-04-09 LAB
GLUCOSE BLDC GLUCOMTR-MCNC: 123 MG/DL (ref 70–99)
GLUCOSE BLDC GLUCOMTR-MCNC: 151 MG/DL (ref 70–99)
GLUCOSE BLDC GLUCOMTR-MCNC: 158 MG/DL (ref 70–99)
GLUCOSE BLDC GLUCOMTR-MCNC: 99 MG/DL (ref 70–99)
MAGNESIUM SERPL-MCNC: 2.2 MG/DL (ref 1.7–2.3)
POTASSIUM SERPL-SCNC: 3.7 MMOL/L (ref 3.4–5.3)

## 2024-04-09 PROCEDURE — 99231 SBSQ HOSP IP/OBS SF/LOW 25: CPT | Mod: GC | Performed by: PSYCHIATRY & NEUROLOGY

## 2024-04-09 PROCEDURE — 250N000013 HC RX MED GY IP 250 OP 250 PS 637: Performed by: INTERNAL MEDICINE

## 2024-04-09 PROCEDURE — 120N000001 HC R&B MED SURG/OB

## 2024-04-09 PROCEDURE — 36415 COLL VENOUS BLD VENIPUNCTURE: CPT | Performed by: INTERNAL MEDICINE

## 2024-04-09 PROCEDURE — 97530 THERAPEUTIC ACTIVITIES: CPT | Mod: GP

## 2024-04-09 PROCEDURE — 99233 SBSQ HOSP IP/OBS HIGH 50: CPT | Performed by: INTERNAL MEDICINE

## 2024-04-09 PROCEDURE — 97750 PHYSICAL PERFORMANCE TEST: CPT | Mod: GP

## 2024-04-09 PROCEDURE — 97116 GAIT TRAINING THERAPY: CPT | Mod: GP

## 2024-04-09 PROCEDURE — 83735 ASSAY OF MAGNESIUM: CPT | Performed by: INTERNAL MEDICINE

## 2024-04-09 PROCEDURE — 250N000013 HC RX MED GY IP 250 OP 250 PS 637: Performed by: STUDENT IN AN ORGANIZED HEALTH CARE EDUCATION/TRAINING PROGRAM

## 2024-04-09 PROCEDURE — 84132 ASSAY OF SERUM POTASSIUM: CPT | Performed by: INTERNAL MEDICINE

## 2024-04-09 PROCEDURE — 99223 1ST HOSP IP/OBS HIGH 75: CPT | Mod: 25 | Performed by: INTERNAL MEDICINE

## 2024-04-09 RX ORDER — CARVEDILOL 6.25 MG/1
6.25 TABLET ORAL 2 TIMES DAILY WITH MEALS
Status: DISCONTINUED | OUTPATIENT
Start: 2024-04-09 | End: 2024-04-10 | Stop reason: HOSPADM

## 2024-04-09 RX ORDER — LOSARTAN POTASSIUM 100 MG/1
100 TABLET ORAL DAILY
Status: DISCONTINUED | OUTPATIENT
Start: 2024-04-10 | End: 2024-04-10 | Stop reason: HOSPADM

## 2024-04-09 RX ADMIN — INSULIN ASPART 1 UNITS: 100 INJECTION, SOLUTION INTRAVENOUS; SUBCUTANEOUS at 18:05

## 2024-04-09 RX ADMIN — CARVEDILOL 6.25 MG: 6.25 TABLET, FILM COATED ORAL at 08:31

## 2024-04-09 RX ADMIN — CARVEDILOL 6.25 MG: 6.25 TABLET, FILM COATED ORAL at 18:11

## 2024-04-09 RX ADMIN — INSULIN ASPART 1 UNITS: 100 INJECTION, SOLUTION INTRAVENOUS; SUBCUTANEOUS at 09:19

## 2024-04-09 RX ADMIN — ATORVASTATIN CALCIUM 80 MG: 80 TABLET, FILM COATED ORAL at 20:28

## 2024-04-09 RX ADMIN — ASPIRIN 325 MG: 325 TABLET, COATED ORAL at 08:31

## 2024-04-09 RX ADMIN — AMLODIPINE BESYLATE 5 MG: 5 TABLET ORAL at 08:31

## 2024-04-09 ASSESSMENT — ACTIVITIES OF DAILY LIVING (ADL)
ADLS_ACUITY_SCORE: 37
ADLS_ACUITY_SCORE: 38
ADLS_ACUITY_SCORE: 37
ADLS_ACUITY_SCORE: 37
ADLS_ACUITY_SCORE: 38
ADLS_ACUITY_SCORE: 38
ADLS_ACUITY_SCORE: 37
ADLS_ACUITY_SCORE: 37
DEPENDENT_IADLS:: INDEPENDENT
ADLS_ACUITY_SCORE: 38
ADLS_ACUITY_SCORE: 37
ADLS_ACUITY_SCORE: 38
ADLS_ACUITY_SCORE: 37
ADLS_ACUITY_SCORE: 37

## 2024-04-09 NOTE — PROGRESS NOTES
04/09/24 0954   Signing Clinician's Name / Credentials   Signing clinician's name / credentials Jen Tim, PT, DPT   Functional Gait Assessment (JESSICA Castañeda, REUBEN Andujar, et al. (2004))   1. GAIT LEVEL SURFACE 2   2. CHANGE IN GAIT SPEED 3   3. GAIT WITH HORIZONTAL HEAD TURNS 1   4. GAIT WITH VERTICAL HEAD TURNS 2   5. GAIT AND PIVOT TURN 1   6. STEP OVER OBSTACLE 1   7. GAIT WITH NARROW BASE OF SUPPORT 2   8. GAIT WITH EYES CLOSED 0   9. AMBULATING BACKWARDS 1   10. STEPS 1   Total Functional Gait Assessment Score   TOTAL SCORE: (MAXIMUM SCORE 30) 14       Functional Gait Assessment (FGA): The FGA assesses postural stability during various walking tasks.     Gait assistive device used: None     Scores of <22 /30 have been correlated with predicting falls in community-dwelling older adults according to Garry & Carlos 2010.   Scores of <18 /30 have been correlated with increased risk for falls in patients with Parkinsons Disease according to Ned Beth, Melendez et al 2014.  Minimal Detectable Change for patients with acute/chronic stroke = 4.2 according to Thibuzz & Ritschel 2009  Minimal Detectable Change for patients with vestibular disorder = 8 according to Garry & Carlos 2010     Assessment (rationale for performing, application to patient s function & care plan): Performed to assess balance with gait. Scored at 14/30 indicating patient at increased risk for falls. Pt will benefit from continued skilled PT Intervention to reduce risk for falls.   (Minutes billed as physical performance test): 9

## 2024-04-09 NOTE — PROGRESS NOTES
[facility]    Medicine Progress Note - Hospitalist Service    Date of Admission:  4/7/2024    Assessment & Plan   Chucho Ac is a 68 year old male with history of insulin-dependent diabetes mellitus, hypertension and hyperlipidemia who presents with right-sided numbness and weakness and difficulty speaking.MRi showed acute Lacunar stroke started on regular aspirin by Neurology. Pt with hypertensive emergency with systolics in the 190s with diastolic in the 120s to 140s started on Norvasc and coreg for BP control. Cardiology consulted as Echo showed LVEF 45-50% with inferior wall motion abnormalities. Cardiology recommended out pt jm scan stress test and f/unit(s) with Cardiology JIN after discharge      Acute lacunar infarct involving the left central maxine and left frontoparietal corona radiata  -Out of window for intervention as his symptoms started 3 days prior to presentation  -MRI brain shows acute lacunar infarct of the left central maxine and left frontoparietal corona radiata with no associated hemorrhage and moderate chronic small vessel ischemic changes  -Head and neck MRA with no significant stenosis/occlusion  Started on regular aspirin by Neurology   CT chest angiogram done returned negative for thrombus  -Echo showed Ef 45-50% with inferior wall motion abnormalities. Cardiology consulted. Out pt follow up and jm scan stress test  outpt ordered   - Allow permissive hypertension, euvolemia, euthermiaHemoglobin A1c checked returned at 7.3%  LDL elevated at 119.  Started on Lipitor 80 mg p.o. daily by neurology  CT angiogram heart ordered by neurology      Patient is currently on moderate carb controlled diet    Hypertensive emrgency  Type 2 diabetes mellitus reasonably well-controlled with hemoglobin A1c of 7.3%  Hyperlipidemia  Medical noncompliance  -Patient does report to history of hypertension hyperlipidemia and insulin-dependent diabetes mellitus.  However reports that  he has not been taking his medication including insulin for more than a month now given he is unable to monitor his blood sugars or blood pressure  -Blood sugar on arrival was 263, hemoglobin A1c at 7.3%  -Start with high resistance sliding scale insulin monitor and adjust medication as needed  Fasting LPA showed total cholesterol 181, triglycerides 199, , HDL low at 28  Patient was started on Lipitor 80 mg p.o. daily per neurology  BP running very high systolics 190s to diastolic 120s to 130s   Started on Norvasc 5mg po daily and Coreg 6.25mg PO BID for better BP control     Elevated troponin  New LVEF 45-50% with inferior wall motion abnormalities   -Patient denies any chest pain.  Troponin at presentation 74, repeat 76  -EKG with T wave inversion in lateral leads but no previous EKG to compare  -Could be demand ischemia from acute stroke/hypertension as above  -Being monitored on  telemetry as above  Cardiology consulted , out pt follow up recommended      Anemia, mild  Leukopenia, mild resolved  Thrombocytopenia, mild  -No baseline to compare  -Monitor and if remains stable outpatient follow-up  Hemoglobin is stable from 12.4-12.9     Possible CKD stage II  -Previous labs from 2011 shows creatinine at baseline 1.1-1.2.  -Creatinine at presentation 1.23, monitor, avoid nephrotoxins  Creatinine improved to 1.02 on 4/8/2024        Diet:  Moderate carb control diet   DVT Prophylaxis: Pneumatic Compression Devices  Dumont Catheter: Not present  Lines: None     Cardiac Monitoring: None  Code Status:  Full code, discussed with patient              Clinically Significant Risk Factors                # Thrombocytopenia: Lowest platelets = 123 in last 2 days, will monitor for bleeding   # Hypertension: Noted on problem list       # DMII: A1C = 7.3 % (Ref range: <5.7 %) within past 6 months, PRESENT ON ADMISSION  # Obesity: Estimated body mass index is 31.57 kg/m  as calculated from the following:    Height as of this  "encounter: 1.778 m (5' 10\").    Weight as of this encounter: 99.8 kg (220 lb)., PRESENT ON ADMISSION     # Financial/Environmental Concerns: none         Disposition Plan      Expected Discharge Date: 04/10/2024                Discussed with bedside RN, patient   Discharge in 1-2days  to home with [facility] services once BP well controlled     Saskia Richard MD  Hospitalist Service  [facility]  Securely message with [employer] (more info)  Text page via [employer] Paging/Directory   ______________________________________________________________________    Interval History   Chart reviewed, patient seen this morning    Resting comfortably in chair.  Difficulty speaking resolved.Right leg still feels heavy. BP running very high started on BP meds. Cardiology consulted .  No other acute issues     Physical Exam   Vital Signs: Temp: 97.7  F (36.5  C) Temp src: Oral BP: (!) 142/81 Pulse: 95   Resp: 16 SpO2: 95 % O2 Device: None (Room air)    Weight: 220 lbs 0 oz    General Appearance: Alert awake, not in acute distress  Respiratory: Clear to auscultation bilaterally  Cardiovascular: Normal rate rhythm regular  GI: Soft, nontender nondistended bowel sounds positive  Skin: Warm and dry  Other:      Medical Decision Making       52 MINUTES SPENT BY ME on the date of service doing chart review, history, exam, documentation & further activities per the note.      Data     I have personally reviewed the following data over the past 24 hrs:    N/A  \   N/A   / N/A     N/A N/A N/A /  99   3.7 N/A N/A \       Imaging results reviewed over the past 24 hrs:   Recent Results (from the past 24 hour(s))   CTA  Angiogram Heart    Narrative    Procedure: CTA HEART STRUCTURE   Examination Date: 4/8/2024 2:34 PM     Indication:  2 territory stroke..     Clinical Information: two territory stroke     Ordering Provider: PATRICK Caldera    Overall quality of the study: Good.     PROCEDURE: ECG gated multi-slice computed tomography of the " heart   with and without intravenous contrast  ( Isovue 370, 100 cc, wasted 0  cc) was  performed on a Siemens Dual Source Flash scanner without  incident. Beta-blockers were used to optimize heart rate (Metoprolol 0  mg Oral/ Metoprolol 0 mg IV). Sublingual Nitrostat 0.4 mg was given  prior to scanning. CTA was performed in the flash mode at a heart rate  of 96 bpm with 100 kVp. Images were reconstructed and analyzed on a  [employer] workstation. Scan protocol was optimized to minimize  radiation exposure. The total radiation exposure was calculated to be  183 DLP, and 2.56 mSv.    FINDINGS:      1. Normal pulmonary venous anatomy with all pulmonary veins draining  into the left atrium.  2. The left atrial appendage is large and appears free of thrombus in  the immediate and also in the delayed images. No thrombus was seen  within the left atrium nor the  right atrium.  3. No thrombus within the left ventricle.  4. The distal portion of the ascending aorta is trivially calcified  but otherwise no plaque or masses were identified within the aortic  root nor in the other portions of the ascending aorta. The ascending  aorta at the level of the right pulmonary artery is mildly dilated  measuring 4.17 x 4.04 cm. The aortic root is borderline dilated  measuring 4.06 x 3.67 cm. The aortic valve is trileaflet. Mild  scattered calcification noted within the visualized portions of the  descending thoracic aorta.  5. Moderate scattered calcification in the mid left anterior  descending artery  6.   Please review Radiology report for incidental noncardiac findings  that  will follow separately.        MEAGHAN STEVENSON MD         SYSTEM ID:  [id number]   Radiologist Consult For Cardiology    Narrative    RADIOLOGIST CONSULT FOR CARDIOLOGY 4/8/2024 2:34 PM    HISTORY: Elevated troponin, hypertension    COMPARISON: None.      Impression    IMPRESSION: See cardiologist report for cardiovascular findings. The  visualized lungs are  clear.    SARTHAK BUTLER MD         SYSTEM ID:  [id number]

## 2024-04-09 NOTE — CONSULTS
Care Management Initial Consult    General Information  Assessment completed with: Patient,  daughter Chelsy (via phone)  Type of CM/SW Visit: Initial Assessment    Primary Care Provider verified and updated as needed:   yes  Readmission within the last 30 days:        Reason for Consult: discharge planning  Advance Care Planning: Advance Care Planning Reviewed: no concerns identified          Communication Assessment  Patient's communication style: spoken language (English or Bilingual)             Cognitive  Cognitive/Neuro/Behavioral: .WDL except  Level of Consciousness: alert  Arousal Level: opens eyes spontaneously  Orientation: oriented x 4  Mood/Behavior: calm, cooperative  Best Language: 0 - No aphasia  Speech: clear    Living Environment:   People in home: alone     Current living Arrangements: apartment      Able to return to prior arrangements:         Family/Social Support:  Care provided by: self  Provides care for: no one  Marital Status: Single  Children          Description of Support System: Supportive         Current Resources:   Patient receiving home care services: No     Community Resources: None  Equipment currently used at home: none  Supplies currently used at home:      Employment/Financial:  Employment Status: retired        Financial Concerns: none           Does the patient's insurance plan have a 3 day qualifying hospital stay waiver?  No    Lifestyle & Psychosocial Needs:  Social Determinants of Health     Food Insecurity: Not on file   Depression: Not on file   Housing Stability: Not on file   Tobacco Use: Unknown (4/9/2024)    Patient History     Smoking Tobacco Use: Never     Smokeless Tobacco Use: Unknown     Passive Exposure: Not on file   Financial Resource Strain: Not on file   Alcohol Use: Not on file   Transportation Needs: Not on file   Physical Activity: Not on file   Interpersonal Safety: Not on file   Stress: Not on file   Social Connections: Not on file       Functional  "Status:  Prior to admission patient needed assistance:   Dependent ADLs:: Independent  Dependent IADLs:: Independent       Mental Health Status:          Chemical Dependency Status:                Values/Beliefs:  Spiritual, Cultural Beliefs, Alevism Practices, Values that affect care:                 Additional Information:  CM consult for discharge planning.  Per chart review, patient has history of insulin-dependent diabetes mellitus, hypertension and hyperlipidemia who presents with right-sided numbness and weakness and difficulty speaking.  Met with patient.  He lives alone in an apartment in [place].  He has 2 children, one lives in [place] and one lives in [place].  He is independent and was driving.  He states his PCP is \"Dr. Weaver\" at [facility] in [place].  Discussed his medications and he stated he does not take anything.  He stated he stopped his diabetes medication about a month ago.  He stated he stopped because he didn't think he needed to take it anymore and denied having difficulty paying for medications.  He denies he will need any help at home. Discussed that PT recommends assistance due to risk for falling from balance issues.  He denied this assessment.  He did agree that he would need a walker.  He agrees to have PCP appointment scheduled but would like CCRN to check with daughter when would be a good time as she would take him to appointment.    Spoke to his daughter Chelsy.  Discussed that he would need some assistance at home due to risk for falling.  Per SW who had spoken to patient's son, he would like his father to have [facility] so home care referral was sent (RN, PT, OT).  She stated an appointment next week on Thursday would work best for her to take him there.      An appointment for hospital follow up is scheduled with Dr. Owen Potts at [facility] [place] on Thursday April 18 at 3:00 p.m.       Taryn Bautista RN, BSN, PHN  Inpatient Care Coordination  [facility]" [facility]  Phone: [phone]

## 2024-04-09 NOTE — PLAN OF CARE
"Goal Outcome Evaluation:      Plan of Care Reviewed With: patient, family    Overall Patient Progress: improvingOverall Patient Progress: improving    Outcome Evaluation: Bp improving    Reason for Admission: L CVA      Cognitive/Mentation: A/Ox 4  Neuros/CMS: Intact ex slurred speech, reported \"R leg feels heavy\" but is improving. LUE/LLE 5/5, RUE 5/5, RLE 4/5. Ataxia in RUE and RLE.  VS: /85 (BP Location: Left arm, Patient Position: Chair, Cuff Size: Adult Regular)   Pulse 83   Temp 97.7  F (36.5  C) (Oral)   Resp 16   Ht 1.778 m (5' 10\")   Wt 99.8 kg (220 lb)   SpO2 95%   BMI 31.57 kg/m    Tele: NSR.  GI:  Bowel sounds active, Continent. Refused bowel meds.  : voiding adequately, Continent.  Pulmonary: LS clear .  Pain: denied .      Drains/Lines: IV  Skin: dry BLE  Activity: Indep in room with walker provided by PT. Pt refuses assistance and was educated on risk of falling by PT and writer. Pt verbalized understanding.    Diet: mod carb with thin liquids. Takes pills whole.      Therapies recs: home with assist   Discharge: pending     Aggression Stoplight Tool: green     Pt worked with therapies this shift and visited with family.  "

## 2024-04-09 NOTE — DISCHARGE INSTRUCTIONS
A follow-up appointment was scheduled for you [see above].  It is very important to go to it--bring these papers, an ID, and your insurance card with you.  At the visit, talk about your hospital stay.  Tell your doctor how you feel.  Show your new list of medications.  Your doctor will update records, make sure you are still doing OK, and decide if any tests or medication changes are needed.        HOMECARE NOTE:   Your doctor has ordered home care to help you after your hospital stay.  The staff will contact you to schedule your first visit.  This service will be provided by [facility].  If you have any question, or have not received a call within 48 hours of discharge, please call them at [phone], choose option #1 for Home Health, then choose option #1 for scheduling.    *please see homecare quality ratings for all homecares in your area at www.medicare.gov       Your risk factors for stroke or TIA (transient ischemic attack):     Your Risk Factors Your Results Goals   [x] High blood pressure BP: 135/59 (04/10/24 1415) Less than 120/80   [x] Cholesterol          Total 4/7/2024: 181 mg/dL   Less than 150    Triglycerides   4/7/2024: 199 mg/dL Less than 150    LDL 4/7/2024: 113 mg/dL    Less than 70    HDL 4/7/2024: 28 mg/dL         Greater than 40 (men)  Greater than 50 (women)   [x] Diabetes                A1C 4/7/2024: 7.3 % Less than 5.7   [] Atrial fibrillation Atrial fibrillation noted on cardiac monitoring Manage per physician orders   [] Smoking/tobacco use   Tobacco Use      Smoking status: Never      Smokeless tobacco: None   Quit smoking and tobacco   [x] Overweight Body mass index is 31.57 kg/m .  Less than 25     Other risk factors include: carotid (neck) artery disease, other heart diseases, prior stroke or TIA, poor diet, lack of exercise, and excessive alcohol consumption.     [x] Written stroke educational materials given to patient and family including:   - Learning  about BE FAST: Stroke Warning Signs and Learning about Risk Factors for Stroke (Healthwise)        Know the warning signs and symptoms of stroke: BE FAST     B = Balance loss   E = Eyesight changes   F = Facial droop or numbness   A = Arm or leg weakness   S = Speech difficulty, slurred speech   T = Time to call 911 for help

## 2024-04-09 NOTE — PROGRESS NOTES
Care Management Follow Up    Length of Stay (days): 2    Expected Discharge Date: 04/10/2024     Concerns to be Addressed: discharge      Patient plan of care discussed at interdisciplinary rounds: Yes    Anticipated Discharge Disposition:  home with [facility] PT and nursing     Additional Information:  JANAE spoke to son Chucho, who is asking for [facility] referral to be sent as patient will not be able to leave the home. Referral to be sent by [facility].     Joy Rust, ASMITA, LGSW   Social Work   [facility]

## 2024-04-09 NOTE — PROGRESS NOTES
This note written by the medical student has been reviewed and has been addended as needed. I agree with the assessment and plan.      Tea Caldera MD  Vascular Neurology Fellow        [facility]    Stroke Progress Note    Interval Events  Patient had period of elevated , given 5mg amlodipine. -160s since. No other acute events overnight. Patient states that he feels back to normal. He has been up walking, feels his speech is back to normal, and does not feel weak in his right arm. He does note that his right leg feels a little bit heavy still. He acknowledges the importance of keeping up with taking his medications.     HPI Summary  Chucho Ac is a 68 year old male with history of insulin-dependent diabetes mellitus, hypertension and hyperlipidemia who presented on 4/7 with right-sided numbness and weakness and difficulty speaking. Per patient and patient's son, he reported that he initially noticed numbness in his right fingers with subsequent heaviness on his right leg three days prior to presentation. He states that he had to drag his right foot due to weakness of his right leg. He also noticed some slurring us of speech. His son came to visit and noticed the symptoms and brought him to the ED. He states that the slurred speech was improved by the time he got to the ED. He denied chest pain, shortness of breath, vision changes, feelings of unsteadiness, fever, chills, nausea, vomiting, diarrhea, cough, difficulty swallowing.     The patient reported that for about a month prior he had not been taking his insulin and his antihypertensives because he never refilled them after his initial prescription ran out.     Stroke Evaluation Summarized  MRI/Head CT Acute lacunar infarcts at the left central maxine and left frontoparietal corona radiata. No associated hemorrhage.  Moderate chronic small vessel ischemic change.   Intracranial Vasculature Normal MRA [place].   "  Cervical Vasculature Normal neck MRA.       Echocardiogram Moderate to severe concentric left ventricular hypertrophy (IVSD 1.6cm). Visual ejection fraction 45-50%.  Mid inferior and inferolateral hypokinesis.      EKG/Telemetry Unusual P axis, possible ectopic atrial tachycardia with occasional Premature ventricular complexes      Cardiac CT No evidence of thrombus      LDL  4/7/2024: 113 mg/dL   A1C  4/7/2024: 7.3 %   Troponin 4/8/2024: 80 ng/L       Impression   Acute ischemic stroke of left central maxine and left frontoparietal corona radiata most likely due to cardio embolism given ectopic atrial foci on EKG and location. Small vessel disease less likely to be contributory to this event given different stroke locations but need to be addressed for secondary prevention. No evidence of LVO on imaging.     Plan  - Neurochecks and Vital Signs every 4 hours   - normotensive BP goal  - Daily aspirin 325 mg for secondary stroke prevention  - Statin: atorvastatin 80mg  - Bedside Glucose Monitoring  - PT/OT/SLP  - social work coordinating home care as patient lives alone   - Stroke Education  - Euthermia, Euglycemia    Patient Follow-up    Stroke clinic follow up in 4-6 weeks  Follow up with PCP in 1 week    We will continue to follow.  Stroke neurology will sign off at this time. Please feel free to call for any further questions or concerns.    The Stroke Fellow is Dr. Caldera. The Stroke Staff is Dr. Aviles.    Steve Ta  Medical Student  To page a member of the stroke/neurocritical care service, click here:  [facility]   Choose \"On Call\" tab at top, then search dropdown box for \"Neurology Adult\", select location, press Enter, then look for stroke/neuro ICU/telestroke.  ______________________________________________________    Clinically Significant Risk Factors                # Thrombocytopenia: Lowest platelets = 123 in last 2 days, will monitor for bleeding   # Hypertension: Noted on problem list       # DMII: A1C = " "7.3 % (Ref range: <5.7 %) within past 6 months, PRESENT ON ADMISSION  # Obesity: Estimated body mass index is 31.57 kg/m  as calculated from the following:    Height as of this encounter: 1.778 m (5' 10\").    Weight as of this encounter: 99.8 kg (220 lb)., PRESENT ON ADMISSION              Medications   Scheduled Meds  Current Facility-Administered Medications   Medication Dose Route Frequency Provider Last Rate Last Admin    amLODIPine (NORVASC) tablet 5 mg  5 mg Oral Daily Saskia Richard MD   5 mg at 04/08/24 1758    aspirin (ASA) EC tablet 325 mg  325 mg Oral Daily Mirtha Pinzon MD   325 mg at 04/08/24 1508    Or    aspirin (ASA) chewable tablet 324 mg  324 mg Oral or NG Tube Daily Mirtha Pinzon MD        Or    aspirin (ASA) Suppository 300 mg  300 mg Rectal Daily Mirtha Pinzon MD        atorvastatin (LIPITOR) tablet 80 mg  80 mg Oral QPM Tea Caldera MD   80 mg at 04/08/24 1946    carvedilol (COREG) tablet 6.25 mg  6.25 mg Oral BID w/meals Saskia Richard MD        insulin aspart (NovoLOG) injection (RAPID ACTING)  1-10 Units Subcutaneous TID AC Mirtha Pinzon MD   4 Units at 04/08/24 1258    insulin aspart (NovoLOG) injection (RAPID ACTING)  1-7 Units Subcutaneous At Bedtime Mirtha Pinzon MD        sodium chloride (PF) 0.9% PF flush 3 mL  3 mL Intracatheter Q8H Mirtha Pinzon MD   3 mL at 04/09/24 0016       Infusion Meds  Current Facility-Administered Medications   Medication Dose Route Frequency Provider Last Rate Last Admin    medication instruction - No oral meds if patient didn't pass dysphagia screen   Does not apply Continuous PRN Mirtha Pinzon MD        Medication Instructions - Avoid dextrose in IV solutions.   Intravenous Continuous PRN Mirtha Pinzon MD           PRN Meds  Current Facility-Administered Medications   Medication Dose Route Frequency Provider Last Rate Last Admin    glucose gel 15-30 g  15-30 g Oral Q15 Min PRN Mirtha Pinzon MD        Or    dextrose 50 % injection 25-50 mL  25-50 " mL Intravenous Q15 Min PRN Mirtha Pinzon MD        Or    glucagon injection 1 mg  1 mg Subcutaneous Q15 Min Mirtha Bey MD        labetalol (NORMODYNE/TRANDATE) injection 10-20 mg  10-20 mg Intravenous Q10 Min PRN Tea Caldera MD        Or    hydrALAZINE (APRESOLINE) injection 10-20 mg  10-20 mg Intravenous Q1H PRN Tea Caldera MD        lidocaine (LMX4) cream   Topical Q1H PRN Mirtha Pinzon MD        lidocaine 1 % 0.1-1 mL  0.1-1 mL Other Q1H PRN Mirtha Pinzon MD        medication instruction - No oral meds if patient didn't pass dysphagia screen   Does not apply Continuous PRN Mirtha Pinzon MD        Medication Instructions - Avoid dextrose in IV solutions.   Intravenous Continuous PRN Mirtha Pinzon MD        ondansetron (ZOFRAN ODT) ODT tab 4 mg  4 mg Oral Q6H PRN Mirtha Pinzon MD        Or    ondansetron (ZOFRAN) injection 4 mg  4 mg Intravenous Q6H PRN Mirtha Pinzon MD        sodium chloride (PF) 0.9% PF flush 3 mL  3 mL Intracatheter q1 min Mirtha Bey MD   3 mL at 04/08/24 1544          PHYSICAL EXAMINATION  Temp:  [97.4  F (36.3  C)-98.5  F (36.9  C)] 98.2  F (36.8  C)  Pulse:  [] 69  Resp:  [16-18] 17  BP: (149-195)/() 165/106  SpO2:  [92 %-97 %] 95 %      Neurologic  Mental Status:  alert, oriented x 3, follows commands, naming and repetition normal  Cranial Nerves:  visual fields intact, PERRL, EOMI with normal smooth pursuit, facial sensation intact and symmetric, facial movements symmetric, hearing not formally tested but intact to conversation, palate elevation symmetric and uvula midline, shoulder shrug strong bilaterally, tongue protrusion midline, mild dysarthria   Motor:  normal muscle tone and bulk, no abnormal movements, able to move all limbs spontaneously, no pronator drift, strength 5/5 LUE and LLE, 4+/5 RUE, 5-/5 RLE,   Sensory:  light touch sensation intact and symmetric throughout upper and lower extremities, no extinction on double simultaneous stimulation  "  Coordination:   ataxia of RUE and RLE on finger to nose and heel to shin   Station/Gait:  deferred    Stroke Scales    NIHSS  1a. Level of Consciousness 0-->Alert, keenly responsive   1b. LOC Questions 0-->Answers both questions correctly   1c. LOC Commands 0-->Performs both tasks correctly   2.   Best Gaze 0-->Normal   3.   Visual 0-->No visual loss   4.   Facial Palsy 0-->Normal symmetrical movements   5a. Motor Arm, Left 0-->No drift, limb holds 90 (or 45) degrees for full 10 secs   5b. Motor Arm, Right 0-->No drift, limb holds 90 (or 45) degrees for full 10 secs   6a. Motor Leg, Left 0-->No drift, leg holds 30 degree position for full 5 secs   6b. Motor Leg, right 0-->No drift, leg holds 30 degree position for full 5 secs   7.   Limb Ataxia 2-->Present in two limbs   8.   Sensory 0-->Normal, no sensory loss   9.   Best Language 0-->No aphasia, normal   10. Dysarthria 1-->Mild-to-moderate dysarthria, patient slurs at least some words and, at worst, can be understood with some difficulty   11. Extinction and Inattention  0-->No abnormality   Total 3 (04/09/24 1403)         Imaging  I personally reviewed all imaging; relevant findings per HPI.     Lab Results Data   CBC  Recent Labs   Lab 04/08/24  0948 04/07/24  1554   WBC 4.2 3.0*   RBC 4.87 4.57   HGB 12.9* 12.4*   HCT 39.4* 36.8*   * 123*     Basic Metabolic Panel    Recent Labs   Lab 04/08/24  2135 04/08/24  1709 04/08/24  1205 04/08/24  0948 04/07/24  2257 04/07/24  1554   NA  --   --   --  137  --  138   POTASSIUM  --   --   --  4.2  --  3.7   CHLORIDE  --   --   --  98  --  100   CO2  --   --   --  26  --  22   BUN  --   --   --  12.7  --  18.3   CR  --   --   --  1.02  --  1.23*   * 128* 221* 212*   < > 263*   LEN  --   --   --  9.4  --  9.1    < > = values in this interval not displayed.     Liver Panel  No results for input(s): \"PROTTOTAL\", \"ALBUMIN\", \"BILITOTAL\", \"ALKPHOS\", \"AST\", \"ALT\", \"BILIDIRECT\" in the last 168 hours.  INR    Recent " Labs   Lab Test 04/07/24  1554   INR 1.07      Lipid Profile    Recent Labs   Lab Test 04/07/24  1819   CHOL 181   HDL 28*   *   TRIG 199*     A1C    Recent Labs   Lab Test 04/07/24  1554   A1C 7.3*     Troponin    Recent Labs   Lab 04/08/24  0259 04/07/24  2153 04/07/24  1819   CTROPT 80* 93* 76*

## 2024-04-09 NOTE — CONSULTS
Inpatient Cardiology Consultation.   [facility]  Date of Admission: 4/7/2024  Date of Consult: April 9, 2024      REASON FOR CONSULT:  Abnormal echocardiogram in a patient with acute ischemic stroke.    DIAGNOSES/ASSESSMENT:  Acute ischemic stroke, outside window of intervention due to delayed presentation.  Abnormal echocardiogram with wall motion abnormalities and mildly decreased systolic function, LVEF 45-50%.  Does not have clinical heart failure or active symptoms of ischemia.  Recommend outpatient workup once he has recovered from his acute stroke.  Mild troponin elevation in the context of acute stroke.  Flat trajectory, no symptoms of coronary ischemia. Recommend outpatient workup once he has recovered from his acute stroke.  Uncontrolled hypertension with left ventricular hypertrophy on echocardiogram, in the context of medication noncompliance.  Being addressed by neurology team with current permissive hypertension.  Type 2 diabetes mellitus, on insulin.  Hyperlipidemia with LDL not at goal.  Statin therapy uptitrated by neurology service.    PLAN:  Patient presented with an acute ischemic stroke on a background of historically poorly controlled hypertension with evidence of left ventricular hypertrophy on both EKG and echocardiogram.  He has no symptoms of ischemia.  Prior to this admission, he is to walk for 20 to 25 minutes 2 times daily without any limitation.  His troponin elevation is minimal and flat and can be expected in an acute stroke with systemic hypertension.  At this time, since heparin and anticoagulants are relatively contraindicated, and there is no acute clinically indication for it, coronary angiography not indicated.  Recommend outpatient stress testing and follow-up in the cardiology clinic, once he has recovered from his stroke.  Hypertension and hyperlipidemia management per neurology service.  I have ordered outpatient Lexiscan and JIN follow-up in the  cardiology clinic.  Cardiology will sign off. Thank you for consulting us.      Ulices Suresh MD, MD [facility]  Cardiology    Total time today 85 minutes.    CODE STATUS:  Full Code      HISTORY OF PRESENT ILLNESS:  Chucho Ac is a 68 year old male with insulin-dependent type 2 diabetes mellitus, poorly controlled hypertension, hyperlipidemia, obesity and medication noncompliance, never smoker with delayed presentation of acute ischemic stroke.  Since he presented 3 days after onset of symptoms, outside window for intervention.  Head and neck MRA ruled out significant stenosis.  ECG showed sinus rhythm, left ventricle hypertrophy, ST changes.  CT angiogram of the heart showed normal pulmonary anatomy, no intracardiac thrombus, mildly dilated ascending 4.1 cm, aortic root 4.0 cm trileaflet valve.  Cardiology consulted because echocardiogram is abnormal with moderate to severe concentric left ventricular hypertrophy, mildly decreased left ventricular systolic function with LVEF of 45-50%, mild inferior inferolateral hypokinesis, mildly decreased systolic function and no significant valve disease.  Serial high-sensitivity troponin T minimally elevated and flat at 76 -- 93 -- 80.  Renal panel shows a creatinine of 1.0, electrolytes, , rest of lipid panel shows mixed dyslipidemia with low HDL and hypertriglyceridemia, normal TSH, CBC shows mild anemia with a hemoglobin of 4.9.    And has no active symptoms of coronary ischemia, heart failure or known diagnosis of CAD.  Historically, his blood pressure has not been very controlled which he himself endorses.  He says that he lives in [place], walks in the park close to his house for about 20-25 minutes, 2 times daily without chest pain or dyspnea.  No tobacco use.      REVIEW OF SYSTEMS:  A comprehensive 10 point review of systems was completed and the pertinent positives are documented in history of present illness.    FAMILY HISTORY:  Family History  "  Problem Relation Age of Onset    Family History Negative Mother          in 70s    Family History Negative Father          in 70s of \"natural causes\"    Coronary Artery Disease No family hx of     Cardiomyopathy No family hx of        MEDICATIONS:  Prior to Admission Medications   Prescriptions Last Dose Informant Patient Reported? Taking?   Blood Pressure KIT   No No   Sig: Check BP daily      Facility-Administered Medications: None       HOME MEDICATIONS:  Prior to Admission Medications   Prescriptions Last Dose Informant Patient Reported? Taking?   Blood Pressure KIT   No No   Sig: Check BP daily      Facility-Administered Medications: None       ALLERGIES:  No Known Allergies    PAST MEDICAL HISTORY:  Past Medical History:   Diagnosis Date    Benign essential hypertension     Hypertriglyceridemia     Mixed dyslipidemia     Obesity     Type 2 diabetes mellitus (H)        PAST SURGICAL HISTORY:  Past Surgical History:   Procedure Laterality Date    NO HISTORY OF SURGERY         SOCIAL HISTORY:   Chucho Ac  reports that he has never smoked. He does not have any smokeless tobacco history on file. He reports that he does not drink alcohol and does not use drugs.      PHYSICAL EXAMINATION:  Temp: 98.4  F (36.9  C) Temp src: Oral BP: (!) 167/112 Pulse: 92   Resp: 14 SpO2: 100 % O2 Device: None (Room air)     0700 -  0659  In: 310 [P.O.:300; I.V.:10]  Out: -   Net: 310  Vitals:    24 1500   Weight: 99.8 kg (220 lb)       Constitutional: Comfortable at rest. Cooperative, alert, well developed, well nourished. Normal BMI.   Eyes: Pupils reactive, no icterus. No pallor.   ENT: No cyanosis. Moist mucous membranes.  Cardiovascular: Normal jugular venous pulse and pressure.  Normal carotid pulse character and volume.  No carotid bruit.  Normal apical impulse.  Apical impulse not palpable due to body habitus.  Regular heart sounds.  No S3 or S4.  No murmur or friction rub.   Respiratory: Normal " "respiratory effort with symmetrical chest wall movements and no use of accessory muscles. Bilateral normal breath sounds. No rales or wheeze.  GI: Soft, nontender, active bowel sounds.  No hepatosplenomegaly, ascites or abdominal wall edema.   Skin: No erythema or ecchymosis. No pertinent skin findings.  Neuropsychiatric: Oriented to time place and person.  Affect normal.  Mild dysarthria noted.  Extremities: No edema or clubbing.       Clinically Significant Risk Factors                # Thrombocytopenia: Lowest platelets = 123 in last 2 days, will monitor for bleeding   # Hypertension: Noted on problem list       # DMII: A1C = 7.3 % (Ref range: <5.7 %) within past 6 months, PRESENT ON ADMISSION  # Obesity: Estimated body mass index is 31.57 kg/m  as calculated from the following:    Height as of this encounter: 1.778 m (5' 10\").    Weight as of this encounter: 99.8 kg (220 lb)., PRESENT ON ADMISSION           Chronic Fatigue and Other Debilities: Chronic fatigue, unspecified        Mothilal Naomi Suresh MD, MD    "

## 2024-04-09 NOTE — PLAN OF CARE
Andreea Andrews, RN on 4/9/2024 at 6:00 AM    Reason for Admission: L CVA  Cognitive/Mentation: A/Ox 4  Neuros/CMS: Stable.  VS: VSS on RA.   Tele: NSR.  GI: BS active, + flatus. Continent.  : Continent.  Pulmonary: LS clear.  Pain: denies.   Drains/Lines: PIV SL  Skin: intact  Activity: independent  Diet: mod carb with thin liquids. Takes pills whole.   Therapies recs: Home with assist  Discharge: pending  Aggression Stoplight Tool: Green  End of shift summary: Refusing bed alarm, educated.

## 2024-04-10 VITALS
TEMPERATURE: 98.6 F | HEART RATE: 65 BPM | SYSTOLIC BLOOD PRESSURE: 135 MMHG | OXYGEN SATURATION: 97 % | BODY MASS INDEX: 31.5 KG/M2 | DIASTOLIC BLOOD PRESSURE: 59 MMHG | HEIGHT: 70 IN | WEIGHT: 220 LBS | RESPIRATION RATE: 16 BRPM

## 2024-04-10 LAB
CREAT SERPL-MCNC: 1.19 MG/DL (ref 0.67–1.17)
EGFRCR SERPLBLD CKD-EPI 2021: 66 ML/MIN/1.73M2
GLUCOSE BLDC GLUCOMTR-MCNC: 154 MG/DL (ref 70–99)
GLUCOSE BLDC GLUCOMTR-MCNC: 176 MG/DL (ref 70–99)
MAGNESIUM SERPL-MCNC: 2.1 MG/DL (ref 1.7–2.3)
POTASSIUM SERPL-SCNC: 4.1 MMOL/L (ref 3.4–5.3)

## 2024-04-10 PROCEDURE — 83735 ASSAY OF MAGNESIUM: CPT | Performed by: INTERNAL MEDICINE

## 2024-04-10 PROCEDURE — 84132 ASSAY OF SERUM POTASSIUM: CPT | Performed by: INTERNAL MEDICINE

## 2024-04-10 PROCEDURE — 250N000013 HC RX MED GY IP 250 OP 250 PS 637: Performed by: INTERNAL MEDICINE

## 2024-04-10 PROCEDURE — 36415 COLL VENOUS BLD VENIPUNCTURE: CPT | Performed by: INTERNAL MEDICINE

## 2024-04-10 PROCEDURE — 82565 ASSAY OF CREATININE: CPT | Performed by: INTERNAL MEDICINE

## 2024-04-10 PROCEDURE — 99239 HOSP IP/OBS DSCHRG MGMT >30: CPT | Performed by: INTERNAL MEDICINE

## 2024-04-10 RX ORDER — CARVEDILOL 6.25 MG/1
6.25 TABLET ORAL 2 TIMES DAILY WITH MEALS
Qty: 90 TABLET | Refills: 0 | Status: SHIPPED | OUTPATIENT
Start: 2024-04-10

## 2024-04-10 RX ORDER — ASPIRIN 325 MG
325 TABLET, DELAYED RELEASE (ENTERIC COATED) ORAL DAILY
Qty: 90 TABLET | Refills: 0 | Status: SHIPPED | OUTPATIENT
Start: 2024-04-11

## 2024-04-10 RX ORDER — LOSARTAN POTASSIUM 100 MG/1
100 TABLET ORAL DAILY
Qty: 90 TABLET | Refills: 0 | Status: SHIPPED | OUTPATIENT
Start: 2024-04-11

## 2024-04-10 RX ORDER — AMLODIPINE BESYLATE 5 MG/1
5 TABLET ORAL DAILY
Qty: 90 TABLET | Refills: 0 | Status: SHIPPED | OUTPATIENT
Start: 2024-04-11 | End: 2024-06-24

## 2024-04-10 RX ORDER — ATORVASTATIN CALCIUM 80 MG/1
80 TABLET, FILM COATED ORAL EVERY EVENING
Qty: 90 TABLET | Refills: 0 | Status: SHIPPED | OUTPATIENT
Start: 2024-04-10

## 2024-04-10 RX ADMIN — LOSARTAN POTASSIUM 100 MG: 100 TABLET, FILM COATED ORAL at 08:33

## 2024-04-10 RX ADMIN — ASPIRIN 325 MG: 325 TABLET, COATED ORAL at 08:30

## 2024-04-10 RX ADMIN — AMLODIPINE BESYLATE 5 MG: 5 TABLET ORAL at 08:33

## 2024-04-10 RX ADMIN — INSULIN ASPART 2 UNITS: 100 INJECTION, SOLUTION INTRAVENOUS; SUBCUTANEOUS at 13:19

## 2024-04-10 RX ADMIN — METFORMIN HYDROCHLORIDE 1000 MG: 500 TABLET ORAL at 11:18

## 2024-04-10 RX ADMIN — INSULIN ASPART 1 UNITS: 100 INJECTION, SOLUTION INTRAVENOUS; SUBCUTANEOUS at 09:58

## 2024-04-10 RX ADMIN — CARVEDILOL 6.25 MG: 6.25 TABLET, FILM COATED ORAL at 08:34

## 2024-04-10 ASSESSMENT — ACTIVITIES OF DAILY LIVING (ADL)
ADLS_ACUITY_SCORE: 37

## 2024-04-10 NOTE — PLAN OF CARE
Physical Therapy Discharge Summary    Reason for therapy discharge:    Discharged to home with outpatient therapy.    Progress towards therapy goal(s). See goals on Care Plan in [facility] electronic health record for goal details.  Goals partially met.  Barriers to achieving goals:   discharge from facility.    Therapy recommendation(s):    Continued therapy is recommended.  Rationale/Recommendations:  to address functional mobility and safety. Recommend use of FWW for ambulation.

## 2024-04-10 NOTE — DISCHARGE SUMMARY
"[facility]  Hospitalist Discharge Summary      Date of Admission:  4/7/2024  Date of Discharge:  4/10/2024  Discharging Provider: Saskia Richard MD  Discharge Service: Hospitalist Service    Discharge Diagnoses   Acute lacunar infarct   Uncontrolled HTN with Hypertensive emergency   Hypertensive Heart disease with LVH   New onset cardiomyopathy with EF 45-50% and inferior wall mtion abnormalities, out pt ashli scan stress test ordered by Cardiology   Hyperlipidemia   DM type 2 non insulin dependent     Please see hospital course     Clinically Significant Risk Factors     # DMII: A1C = 7.3 % (Ref range: <5.7 %) within past 6 months  # Obesity: Estimated body mass index is 31.57 kg/m  as calculated from the following:    Height as of this encounter: 1.778 m (5' 10\").    Weight as of this encounter: 99.8 kg (220 lb).       Follow-ups Needed After Discharge   Follow-up Appointments     Follow-up and recommended labs and tests       An appointment for hospital follow up is scheduled with Dr. Owen Potts at [facility] on Thursday April 18 at 3:00 p.m.        Follow-up and recommended labs and tests       Follow up with primary care provider, Owen Potts, within 7 days   for hospital follow- up.  The following labs/tests are recommended: CBC,   BMP in 1week .    F/u with Cardiology in 1week with Ashli scan stress test results         Unresulted Labs Ordered in the Past 30 Days of this Admission       Date and Time Order Name Status Description    4/10/2024 10:25 AM Creatinine In process             Discharge Disposition   Discharged to home with [facility] services   Condition at discharge: Stable    Hospital Course     Chucho Ac is a 68 year old male with history of insulin-dependent diabetes mellitus, hypertension and hyperlipidemia who presents with right-sided numbness and weakness and difficulty speaking.MRi showed acute Lacunar stroke started on regular aspirin by " Neurology. Pt with hypertensive emergency with systolics in the 190s with diastolic in the 120s to 140s started on Norvasc and coreg for BP control. Cardiology consulted as Echo showed LVEF 45-50% with inferior wall motion abnormalities. Cardiology recommended out pt jm scan stress test and f/unit(s) with Cardiology JIN after discharge      Acute lacunar infarct involving the left central maxine and left frontoparietal corona radiata  -Out of window for intervention as his symptoms started 3 days prior to presentation  -MRI brain shows acute lacunar infarct of the left central maxine and left frontoparietal corona radiata with no associated hemorrhage and moderate chronic small vessel ischemic changes  -Head and neck MRA with no significant stenosis/occlusion  Started on regular aspirin by Neurology   CT chest angiogram done returned negative for thrombus  -Echo showed Ef 45-50% with inferior wall motion abnormalities. Cardiology consulted. Out pt follow up and jm scan stress test  outpt ordered   - Allow permissive hypertension, euvolemia, euthermiaHemoglobin A1c checked returned at 7.3%  LDL elevated at 119.  Started on Lipitor 80 mg p.o. daily by neurology  CT angiogram heart ordered by neurology which returned negative for acute thrombus.  Aortic root is borderline dilated measuring 4.06 x 3.67 cm.  Ascending aorta at the level of the right pulmonary artery is mildly dilated measuring 4.17 x 4.04 cm        Patient is currently on moderate carb controlled diet     Hypertensive emrgency  Type 2 diabetes mellitus reasonably well-controlled with hemoglobin A1c of 7.3%  Hyperlipidemia  Medical noncompliance  -Patient does report to history of hypertension hyperlipidemia and insulin-dependent diabetes mellitus.  However reports that he has not been taking his medication including insulin for more than a month now given he is unable to monitor his blood sugars or blood pressure  -Blood sugar on arrival was 263,  hemoglobin A1c at 7.3%  -Start with high resistance sliding scale insulin monitor and adjust medication as needed  Fasting LPA showed total cholesterol 181, triglycerides 199, , HDL low at 28  Patient was started on Lipitor 80 mg p.o. daily per neurology  BP running very high systolics 190s to diastolic 120s to 130s   Started on Norvasc 5mg po daily and Coreg 6.25mg PO BID for better BP control  Blood pressure 147/93 on 4/10/24  AM so started him on losartan 100 mg p.o. daily  Importance of medication compliance and taking all the medications with without missing any doses discussed with the patient multiple times during this hospitalization.   also called and discussed with the daughter about the importance of medication compliance on 4/10/2024 prior to discharging the patient         Elevated troponin  New LVEF 45-50% with inferior wall motion abnormalities   -Patient denies any chest pain.  Troponin at presentation 74, repeat 76  -EKG with T wave inversion in lateral leads but no previous EKG to compare  -Could be demand ischemia from acute stroke/hypertension as above  -Being monitored on  telemetry as above  Cardiology consulted , out pt follow up recommended as noted above after Lexiscan stress test     Anemia, mild  Leukopenia, mild resolved  Thrombocytopenia, mild  -No baseline to compare  -Monitor and if remains stable outpatient follow-up  Hemoglobin is stable from 12.4-12.9     Possible CKD stage II  -Previous labs from 2011 shows creatinine at baseline 1.1-1.2.  -Creatinine at presentation 1.23, monitor, avoid nephrotoxins  Creatinine improved to 1.02 on 4/8/2024        Diet:  Moderate carb control diet   DVT Prophylaxis: Pneumatic Compression Devices  Dumont Catheter: Not present  Lines: None     Cardiac Monitoring: None  Code Status:  Full code, discussed with patient                     Clinically Significant Risk Factors                # Thrombocytopenia: Lowest platelets = 123 in last 2 days,  "will monitor for bleeding   # Hypertension: Noted on problem list       # DMII: A1C = 7.3 % (Ref range: <5.7 %) within past 6 months, PRESENT ON ADMISSION  # Obesity: Estimated body mass index is 31.57 kg/m  as calculated from the following:    Height as of this encounter: 1.778 m (5' 10\").    Weight as of this encounter: 99.8 kg (220 lb)., PRESENT ON ADMISSION     # Financial/Environmental Concerns: none                Disposition Plan     Expected Discharge Date: 04/10/2024       Patient is discharging to home with home health care services with home RN, PT OT    Discussed with patient, bedside RN, care coordinator on 4/10/2024            Consultations This Hospital Stay   NEUROLOGY IP STROKE CONSULT  SPEECH LANGUAGE PATH ADULT IP CONSULT  PHARMACY IP CONSULT  PHARMACY IP CONSULT  PHARMACY IP CONSULT  PHYSICAL THERAPY ADULT IP CONSULT  OCCUPATIONAL THERAPY ADULT IP CONSULT  REHAB ADMISSIONS LIAISON IP CONSULT  CARE MANAGEMENT / SOCIAL WORK IP CONSULT  CARDIOLOGY IP CONSULT  SMOKING CESSATION PROGRAM IP CONSULT    Code Status   Full Code    Time Spent on this Encounter   I, Saskia Richard MD, personally saw the patient today and spent greater than 30 minutes discharging this patient.       Saskia Richard MD  [facility] NEUROSCIENCE UNIT  [address] [place] [address]  Phone: [phone]  ______________________________________________________________________    Physical Exam   Vital Signs: Temp: 98.6  F (37  C) Temp src: Oral BP: 126/82 Pulse: 64   Resp: 16 SpO2: 99 % O2 Device: None (Room air)    Weight: 220 lbs 0 oz  General Appearance: Alert, awake, NAD   Respiratory: CTA b/l   Cardiovascular: RRR  GI: soft, NT, ND,   Skin:warm and dry   Other:         Primary Care Physician   Owen Potts    Discharge Orders      Follow-Up with Cardiology JIN      Home Care Referral      Follow-up and recommended labs and tests     An appointment for hospital follow up is scheduled with Dr. Weaver " Nazariosandeepid at [facility] on Thursday April 18 at 3:00 p.m.     Reason for your hospital stay    Acute stroke from uncontrolled Hypertnsion started on regular aspirin and Statin. BP meds started. Needs ashli scan stress test and Out pt follow up with Cardiology     Follow-up and recommended labs and tests     Follow up with primary care provider, Owen Potts, within 7 days for hospital follow- up.  The following labs/tests are recommended: CBC, BMP in 1week .    F/u with Cardiology in 1week with Ashli scan stress test results     Activity    Your activity upon discharge: activity as tolerated     Walker Order for DME - ONLY FOR DME     Diet    Follow this diet upon discharge: Orders Placed This Encounter      Moderate Consistent Carb (60 g CHO per Meal) Diet     NM Lexiscan stress test (nuc card)     Stroke Hospital Follow Up (for neurologist use only)    [facility] will call you to coordinate care as prescribed by your provider. If you don t hear from a representative within 2 business days, please call [phone].         Significant Results and Procedures   Most Recent 3 CBC's:  Recent Labs   Lab Test 04/08/24  0948 04/07/24  1554   WBC 4.2 3.0*   HGB 12.9* 12.4*   MCV 81 81   * 123*     Most Recent 3 BMP's:  Recent Labs   Lab Test 04/10/24  1150 04/10/24  0900 04/10/24  0645 04/09/24  2144 04/09/24  0834 04/09/24  0820 04/08/24  1205 04/08/24  0948 04/07/24  2257 04/07/24  1554   NA  --   --   --   --   --   --   --  137  --  138   POTASSIUM  --   --  4.1  --   --  3.7  --  4.2  --  3.7   CHLORIDE  --   --   --   --   --   --   --  98  --  100   CO2  --   --   --   --   --   --   --  26  --  22   BUN  --   --   --   --   --   --   --  12.7  --  18.3   CR  --   --  1.19*  --   --   --   --  1.02  --  1.23*   ANIONGAP  --   --   --   --   --   --   --  13  --  16*   LEN  --   --   --   --   --   --   --  9.4  --  9.1   * 154*  --  123*   < >  --    < > 212*   < > 263*    <  > = values in this interval not displayed.     Most Recent 2 LFT's:No lab results found.  Most Recent 3 INR's:  Recent Labs   Lab Test 04/07/24  1554   INR 1.07     Most Recent INR's and Anticoagulation Dosing History:  Anticoagulation Dose History          Latest Ref Rng & Units 4/7/2024   Recent Dosing and Labs   INR 0.85 - 1.15 1.07      Most Recent 3 Creatinines:  Recent Labs   Lab Test 04/10/24  0645 04/08/24  0948 04/07/24  1554   CR 1.19* 1.02 1.23*     Most Recent 3 Hemoglobins:  Recent Labs   Lab Test 04/08/24  0948 04/07/24  1554   HGB 12.9* 12.4*     Most Recent 3 Troponin's:No lab results found.  Most Recent 3 BNP's:No lab results found.  Most Recent D-dimer:No lab results found.  Most Recent Cholesterol Panel:  Recent Labs   Lab Test 04/07/24  1819   CHOL 181   *   HDL 28*   TRIG 199*     7-Day Micro Results       No results found for the last 168 hours.          Most Recent TSH and T4:  Recent Labs   Lab Test 04/07/24  1819   TSH 2.22     Most Recent Hemoglobin A1c:  Recent Labs   Lab Test 04/07/24  1554   A1C 7.3*     Most Recent 6 glucoses:  Recent Labs   Lab Test 04/10/24  1150 04/10/24  0900 04/09/24  2144 04/09/24  1753 04/09/24  1209 04/09/24  0834   * 154* 123* 158* 99 151*     Most Recent Urinalysis:No lab results found.  Most Recent ABG:No lab results found.  Most Recent ESR & CRP:No lab results found.  Most Recent Anemia Panel:  Recent Labs   Lab Test 04/08/24  0948   WBC 4.2   HGB 12.9*   HCT 39.4*   MCV 81   *     Most Recent CPK:No lab results found.,   Results for orders placed or performed during the hospital encounter of 04/07/24   MRA Brain ([place]) wo Contrast    Narrative    EXAM: MR BRAIN W/O and W CONTRAST, MRA NECK (CAROTIDS) W/O and W CONTRAST, MRA BRAIN ([place]) W/O CONTRAST  LOCATION: [facility]  DATE: 4/7/2024    INDICATION: Acute neuro deficit, stroke suspected  COMPARISON: None.  CONTRAST: 10 mL  Gadavist  TECHNIQUE:   1) Routine multiplanar multisequence head MRI without and with intravenous contrast.  2) 3D time-of-flight head MRA without intravenous contrast.  3) Neck MRA without and with IV contrast. Stenosis measurements made according to NASCET criteria unless otherwise specified.    FINDINGS:  HEAD MRI:  INTRACRANIAL CONTENTS: Focal restricted diffusion at the left central maxine is present consistent with acute lacunar infarct measuring 18 x 6 mm. There are 2 additional patchy areas of restricted diffusion at the left frontoparietal corona radiata   representing acute lacunar infarcts measuring 9 and 6 mm respectively. No mass, acute hemorrhage, or extra-axial fluid collections. Patchy and confluent nonspecific T2/FLAIR hyperintensities within the cerebral white matter and maxine most consistent with   moderate chronic microvascular ischemic change. There is chronic lacunar infarct of the left cerebellar hemisphere and left basal ganglia and right thalamus. There are a few scattered punctate foci of susceptibility artifact favored represent chronic   hypertensive microhemorrhages. Mild generalized cerebral atrophy. No hydrocephalus. Normal position of the cerebellar tonsils. No pathologic contrast enhancement.    SELLA: No abnormality accounting for technique.    OSSEOUS STRUCTURES/SOFT TISSUES: Normal marrow signal. The major intracranial vascular flow voids are maintained.     ORBITS: No abnormality accounting for technique.     SINUSES/MASTOIDS: No paranasal sinus mucosal disease. No middle ear or mastoid effusion.     HEAD MRA:   ANTERIOR CIRCULATION: No stenosis/occlusion, aneurysm, or high flow vascular malformation. Standard [place] of [place] anatomy.    POSTERIOR CIRCULATION: No stenosis/occlusion, aneurysm, or high flow vascular malformation. Balanced vertebral arteries supply a normal basilar artery.     NECK MRA:   RIGHT CAROTID: No measurable stenosis or dissection.    LEFT CAROTID: No  measurable stenosis or dissection.    VERTEBRAL ARTERIES: No focal stenosis or dissection. Balanced vertebral arteries.    AORTIC ARCH: Classic aortic arch anatomy with no significant stenosis at the origin of the great vessels.      Impression    IMPRESSION:  HEAD MRI:  1.  Acute lacunar infarcts at the left central maxine and left frontoparietal corona radiata. No associated hemorrhage.  2.  Moderate chronic small vessel ischemic change.    HEAD MRA:  Normal MRA [place] of [place].    NECK MRA:  Normal neck MRA.     MRA Neck (Carotids) wo & w Contrast    Narrative    EXAM: MR BRAIN W/O and W CONTRAST, MRA NECK (CAROTIDS) W/O and W CONTRAST, MRA BRAIN ([place]) W/O CONTRAST  LOCATION: [facility]  DATE: 4/7/2024    INDICATION: Acute neuro deficit, stroke suspected  COMPARISON: None.  CONTRAST: 10 mL Gadavist  TECHNIQUE:   1) Routine multiplanar multisequence head MRI without and with intravenous contrast.  2) 3D time-of-flight head MRA without intravenous contrast.  3) Neck MRA without and with IV contrast. Stenosis measurements made according to NASCET criteria unless otherwise specified.    FINDINGS:  HEAD MRI:  INTRACRANIAL CONTENTS: Focal restricted diffusion at the left central maxine is present consistent with acute lacunar infarct measuring 18 x 6 mm. There are 2 additional patchy areas of restricted diffusion at the left frontoparietal corona radiata   representing acute lacunar infarcts measuring 9 and 6 mm respectively. No mass, acute hemorrhage, or extra-axial fluid collections. Patchy and confluent nonspecific T2/FLAIR hyperintensities within the cerebral white matter and maxine most consistent with   moderate chronic microvascular ischemic change. There is chronic lacunar infarct of the left cerebellar hemisphere and left basal ganglia and right thalamus. There are a few scattered punctate foci of susceptibility artifact favored represent chronic   hypertensive  microhemorrhages. Mild generalized cerebral atrophy. No hydrocephalus. Normal position of the cerebellar tonsils. No pathologic contrast enhancement.    SELLA: No abnormality accounting for technique.    OSSEOUS STRUCTURES/SOFT TISSUES: Normal marrow signal. The major intracranial vascular flow voids are maintained.     ORBITS: No abnormality accounting for technique.     SINUSES/MASTOIDS: No paranasal sinus mucosal disease. No middle ear or mastoid effusion.     HEAD MRA:   ANTERIOR CIRCULATION: No stenosis/occlusion, aneurysm, or high flow vascular malformation. Standard [place] of [place] anatomy.    POSTERIOR CIRCULATION: No stenosis/occlusion, aneurysm, or high flow vascular malformation. Balanced vertebral arteries supply a normal basilar artery.     NECK MRA:   RIGHT CAROTID: No measurable stenosis or dissection.    LEFT CAROTID: No measurable stenosis or dissection.    VERTEBRAL ARTERIES: No focal stenosis or dissection. Balanced vertebral arteries.    AORTIC ARCH: Classic aortic arch anatomy with no significant stenosis at the origin of the great vessels.      Impression    IMPRESSION:  HEAD MRI:  1.  Acute lacunar infarcts at the left central maxine and left frontoparietal corona radiata. No associated hemorrhage.  2.  Moderate chronic small vessel ischemic change.    HEAD MRA:  Normal MRA [place].    NECK MRA:  Normal neck MRA.     MR Brain w/o & w Contrast    Narrative    EXAM: MR BRAIN W/O and W CONTRAST, MRA NECK (CAROTIDS) W/O and W CONTRAST, MRA BRAIN ([place]) W/O CONTRAST  LOCATION: [facility]  DATE: 4/7/2024    INDICATION: Acute neuro deficit, stroke suspected  COMPARISON: None.  CONTRAST: 10 mL Gadavist  TECHNIQUE:   1) Routine multiplanar multisequence head MRI without and with intravenous contrast.  2) 3D time-of-flight head MRA without intravenous contrast.  3) Neck MRA without and with IV contrast. Stenosis measurements made according to NASCET criteria  unless otherwise specified.    FINDINGS:  HEAD MRI:  INTRACRANIAL CONTENTS: Focal restricted diffusion at the left central maxine is present consistent with acute lacunar infarct measuring 18 x 6 mm. There are 2 additional patchy areas of restricted diffusion at the left frontoparietal corona radiata   representing acute lacunar infarcts measuring 9 and 6 mm respectively. No mass, acute hemorrhage, or extra-axial fluid collections. Patchy and confluent nonspecific T2/FLAIR hyperintensities within the cerebral white matter and maxine most consistent with   moderate chronic microvascular ischemic change. There is chronic lacunar infarct of the left cerebellar hemisphere and left basal ganglia and right thalamus. There are a few scattered punctate foci of susceptibility artifact favored represent chronic   hypertensive microhemorrhages. Mild generalized cerebral atrophy. No hydrocephalus. Normal position of the cerebellar tonsils. No pathologic contrast enhancement.    SELLA: No abnormality accounting for technique.    OSSEOUS STRUCTURES/SOFT TISSUES: Normal marrow signal. The major intracranial vascular flow voids are maintained.     ORBITS: No abnormality accounting for technique.     SINUSES/MASTOIDS: No paranasal sinus mucosal disease. No middle ear or mastoid effusion.     HEAD MRA:   ANTERIOR CIRCULATION: No stenosis/occlusion, aneurysm, or high flow vascular malformation. Standard [place] of [place] anatomy.    POSTERIOR CIRCULATION: No stenosis/occlusion, aneurysm, or high flow vascular malformation. Balanced vertebral arteries supply a normal basilar artery.     NECK MRA:   RIGHT CAROTID: No measurable stenosis or dissection.    LEFT CAROTID: No measurable stenosis or dissection.    VERTEBRAL ARTERIES: No focal stenosis or dissection. Balanced vertebral arteries.    AORTIC ARCH: Classic aortic arch anatomy with no significant stenosis at the origin of the great vessels.      Impression    IMPRESSION:  HEAD MRI:  1.   Acute lacunar infarcts at the left central maxine and left frontoparietal corona radiata. No associated hemorrhage.  2.  Moderate chronic small vessel ischemic change.    HEAD MRA:  Normal MRA [place].    NECK MRA:  Normal neck MRA.     CTA  Angiogram Heart    Narrative    Procedure: CTA HEART STRUCTURE   Examination Date: 4/8/2024 2:34 PM     Indication:  2 territory stroke..     Clinical Information: two territory stroke     Ordering Provider: PATRICK Caldera    Overall quality of the study: Good.     PROCEDURE: ECG gated multi-slice computed tomography of the heart   with and without intravenous contrast  ( Isovue 370, 100 cc, wasted 0  cc) was  performed on a Siemens Dual Source Flash scanner without  incident. Beta-blockers were used to optimize heart rate (Metoprolol 0  mg Oral/ Metoprolol 0 mg IV). Sublingual Nitrostat 0.4 mg was given  prior to scanning. CTA was performed in the flash mode at a heart rate  of 96 bpm with 100 kVp. Images were reconstructed and analyzed on a  [employer] workstation. Scan protocol was optimized to minimize  radiation exposure. The total radiation exposure was calculated to be  183 DLP, and 2.56 mSv.    FINDINGS:      1. Normal pulmonary venous anatomy with all pulmonary veins draining  into the left atrium.  2. The left atrial appendage is large and appears free of thrombus in  the immediate and also in the delayed images. No thrombus was seen  within the left atrium nor the  right atrium.  3. No thrombus within the left ventricle.  4. The distal portion of the ascending aorta is trivially calcified  but otherwise no plaque or masses were identified within the aortic  root nor in the other portions of the ascending aorta. The ascending  aorta at the level of the right pulmonary artery is mildly dilated  measuring 4.17 x 4.04 cm. The aortic root is borderline dilated  measuring 4.06 x 3.67 cm. The aortic valve is trileaflet. Mild  scattered calcification noted within the  visualized portions of the  descending thoracic aorta.  5. Moderate scattered calcification in the mid left anterior  descending artery  6.   Please review Radiology report for incidental noncardiac findings  that  will follow separately.        MEAGHAN STEVENSON MD         SYSTEM ID:  [id number]   Radiologist Consult For Cardiology    Narrative    RADIOLOGIST CONSULT FOR CARDIOLOGY 2024 2:34 PM    HISTORY: Elevated troponin, hypertension    COMPARISON: None.      Impression    IMPRESSION: See cardiologist report for cardiovascular findings. The  visualized lungs are clear.    SARTHAK BUTLER MD         SYSTEM ID:  [id number]   Echocardiogram Complete - For age > 60 yrs     Value    LVEF  45-50%    3D LVEF 46%    Narrative    [id number]  [id number]  [id number]  [id number]^MYA^ALKA     [facility]  Echocardiography Laboratory  [address] [place] [address]     Name: NICOLE ELIZABETH  MRN: [id number]  : 1955  Study Date: 2024 10:38 AM  Age: 68 yrs  Gender: Male  Patient [facility]: [facility]  Reason For Study: Cerebrovascular Incident  Ordering Physician: ALKA ROQUE  Referring Physician: No Ref-Primary, Physician  Performed By: Isabel Weldon     BSA: 2.2 m2  Height: 70 in  Weight: 220 lb  HR: 95  BP: 175/120 mmHg  ______________________________________________________________________________  Procedure  Complete Portable Echo Adult.  ______________________________________________________________________________  Interpretation Summary     There is moderate to severe concentric left ventricular hypertrophy (IVSD 1.6  cm).  Mildly decreased left ventricular systolic function  The visual ejection fraction is 45-50%.  Mid inferior and inferolateral hypokinesis. Wall segment evaluation is limited  in absence of contrast imaging.  Mildly decreased right ventricular systolic function.  No significant valve dysfunction.  The inferior vena cava was normal in size with preserved  respiratory  variability.  There is no pericardial effusion.  Ascending aorta dilatation is present (4.1 cm).     No prior study for comparison.  ______________________________________________________________________________  Left Ventricle  The left ventricle is normal in size. There is moderate to severe concentric  left ventricular hypertrophy. Mildly decreased left ventricular systolic  function. The visual ejection fraction is 45-50%. 3D LVEF volumetric analysis  is 46%. Left ventricular diastolic function is indeterminate. Mid inferior and  inferolateral hypokinesis.     Right Ventricle  The right ventricle is normal size. There is mild right ventricular  hypertrophy. Mildly decreased right ventricular systolic function.     Atria  The left atrium is mildly dilated. Right atrial size is normal.     Mitral Valve  There is mild mitral annular calcification. There is mild (1+) mitral  regurgitation.     Tricuspid Valve  The tricuspid valve is normal in structure and function. Right ventricular  systolic pressure could not be approximated due to inadequate tricuspid  regurgitation. There is trace tricuspid regurgitation.     Aortic Valve  The aortic valve is trileaflet with aortic valve sclerosis. There is mild (1+)  aortic regurgitation.     Pulmonic Valve  The pulmonic valve is normal in structure and function.     Vessels  The aortic root is normal size. Ascending aorta dilatation is present. The  inferior vena cava was normal in size with preserved respiratory variability.     Pericardium  There is no pericardial effusion.     ______________________________________________________________________________  MMode/2D Measurements & Calculations  IVSd: 1.7 cm  LVIDd: 4.5 cm  LVIDs: 3.4 cm  LVPWd: 1.2 cm  FS: 24.0 %  LV mass(C)d: 263.2 grams  LV mass(C)dI: 121.1 grams/m2     Ao root diam: 3.5 cm  LA dimension: 4.5 cm  asc Aorta Diam: 4.1 cm  LA/Ao: 1.3  LVOT diam: 2.2 cm  LVOT area: 3.8 cm2  Ao root diam index  Ht(cm/m): 2.0  Ao root diam index BSA (cm/m2): 1.6  Asc Ao diam index BSA (cm/m2): 1.9  Asc Ao diam index Ht(cm/m): 2.3  EF Biplane: 48.6 %  LA Volume (BP): 67.2 ml  LA Volume Index (BP): 31.0 ml/m2     RWT: 0.56  TAPSE: 1.6 cm     Doppler Measurements & Calculations  MV E max josh: 79.6 cm/sec  MV A max josh: 25.1 cm/sec  MV E/A: 3.2  MV dec slope: 396.5 cm/sec2  MV dec time: 0.20 sec  LV V1 max P.9 mmHg  LV V1 max: 68.7 cm/sec  LV V1 VTI: 13.0 cm  SV(LVOT): 49.0 ml  SI(LVOT): 22.6 ml/m2  PA acc time: 0.08 sec  E/E' av.3  Lateral E/e': 15.2  Medial E/e': 17.4  RV S Josh: 7.9 cm/sec     ______________________________________________________________________________  Report approved by: Naa Cuello 2024 03:40 PM             Discharge Medications   Current Discharge Medication List        START taking these medications    Details   amLODIPine (NORVASC) 5 MG tablet Take 1 tablet (5 mg) by mouth daily  Qty: 90 tablet, Refills: 0    Associated Diagnoses: Hypertension, unspecified type      aspirin (ASA) 325 MG EC tablet Take 1 tablet (325 mg) by mouth daily  Qty: 90 tablet, Refills: 0    Associated Diagnoses: Acute ischemic stroke (H)      atorvastatin (LIPITOR) 80 MG tablet Take 1 tablet (80 mg) by mouth every evening  Qty: 90 tablet, Refills: 0    Associated Diagnoses: Acute ischemic stroke (H)      carvedilol (COREG) 6.25 MG tablet Take 1 tablet (6.25 mg) by mouth 2 times daily (with meals)  Qty: 90 tablet, Refills: 0    Associated Diagnoses: Hypertension, unspecified type      losartan (COZAAR) 100 MG tablet Take 1 tablet (100 mg) by mouth daily  Qty: 90 tablet, Refills: 0    Associated Diagnoses: Hypertension, unspecified type      metFORMIN (GLUCOPHAGE) 1000 MG tablet Take 1 tablet (1,000 mg) by mouth 2 times daily (with meals)  Qty: 90 tablet, Refills: 0    Associated Diagnoses: Type 2 diabetes mellitus without complication, unspecified whether long term insulin use (H)           CONTINUE  these medications which have NOT CHANGED    Details   Blood Pressure KIT Check BP daily  Qty: 1 kit, Refills: 0    Associated Diagnoses: HTN (hypertension)           Allergies   No Known Allergies

## 2024-04-10 NOTE — PLAN OF CARE
Andreea Andrews, RN on 4/10/2024 at 6:00 AM    Reason for Admission: L CVA  Cognitive/Mentation: A/Ox 4  Neuros/CMS: Stable.  VS: VSS on RA.   Tele: NSR.  GI: BS active, + flatus. Continent.  : Continent.  Pulmonary: LS clear.  Pain: denies.   Drains/Lines: PIV SL  Skin: intact  Activity: independent  Diet: mod carb with thin liquids. Takes pills whole.   Therapies recs: Home with assist  Discharge: pending  Aggression Stoplight Tool: Green  End of shift summary: Refusing bed alarm, educated.

## 2024-04-10 NOTE — PROGRESS NOTES
"Pt discharged at 1530 to home with daughter Chelsy. Pt has home hospital services set up. Pt and daughter verbalized understanding of discharge instructions and necessary follow up appointments.Pt sent home with the following medications:  amlodipine, aspirin, atorvastatin,carvedilol, losartan, and metformin.  Pt verbalized understanding of individualized stroke risk factors. Pt sent home with walker.     /59 (BP Location: Right arm)   Pulse 65   Temp 98.6  F (37  C) (Oral)   Resp 16   Ht 1.778 m (5' 10\")   Wt 99.8 kg (220 lb)   SpO2 97%   BMI 31.57 kg/m        Reason for Admission: L CVA      Cognitive/Mentation: A/Ox 4  Neuros/CMS: Intact ex slurred speech, reported \"R leg feels heavy\" but has improved since admission. LUE/LLE 5/5, RUE 5/5, RLE 5/5. Ataxia in RUE and RLE.  Tele: NSR.  GI:  Bowel sounds active, Continent. Refused bowel meds.  : voiding adequately, Continent.  Pulmonary: LS clear .  Pain: denied .      Drains/Lines: IV removed upon discharge  Skin: dry BLE  Activity: Indep in room with walker provided by PT. Pt refuses assistance and was educated on risk of falling by writer. Pt verbalized understanding.         "

## 2024-04-10 NOTE — PROGRESS NOTES
Care Management Discharge Note    Discharge Date: 04/10/2024       Discharge Disposition: Home, Home Care    Discharge Services:      Discharge DME:      Discharge Transportation: car, drives self, family or friend will provide    Private pay costs discussed: Not applicable    Does the patient's insurance plan have a 3 day qualifying hospital stay waiver?  No    PAS Confirmation Code:    Patient/family educated on Medicare website which has current facility and service quality ratings:      Education Provided on the Discharge Plan:    Persons Notified of Discharge Plans:   Patient/Family in Agreement with the Plan: yes    Handoff Referral Completed: No    Additional Information:  Patient will discharge to home today.  Home care set up with [facility] (RN, PT, OT).  No further CM interventions anticipated.    Taryn Bautista RN, BSN, PHN  Inpatient Care Coordination  [facility]  Phone: [phone]

## 2024-04-11 ENCOUNTER — PATIENT OUTREACH (OUTPATIENT)
Dept: CARE COORDINATION | Facility: CLINIC | Age: 69
End: 2024-04-11
Payer: MEDICARE

## 2024-04-11 ENCOUNTER — TELEPHONE (OUTPATIENT)
Dept: CARDIOLOGY | Facility: CLINIC | Age: 69
End: 2024-04-11
Payer: MEDICARE

## 2024-04-11 NOTE — PROGRESS NOTES
Clinic Care Coordination Contact  [facility]/Voicemail    Clinical Data: Care Coordinator Outreach    Outreach Documentation Number of Outreach Attempt   4/11/2024  11:41 AM 2       Un-able to Leave a message patient's both numbers on file were not related to patient. Wrong #'s     Care Coordinator will do no further outreaches at this time.    Jocelyne Ortega  [phone]  Care

## 2024-04-11 NOTE — TELEPHONE ENCOUNTER
Patient was admitted to [facility] on 4/7/24 presents with right-sided numbness and weakness and difficulty speaking. Acute ischemic CVA, outside window of intervention due to delayed presentation. Also found to have abnormal echo. Mild troponin elevation in the context of acute stroke. Flat trajectory. Pt with hypertensive emergency with systolics in the 190's with diastolic in the 120's to 140's. Cardiology and Neurology consulted.    PMH: uncontrolled HTN, HLD, DM2.    MRI showed acute Lacunar stroke.    4/8/24: Echo showed EF of 45-50% with inferior wall motion abnormalities.     4/8/24: CTA returned negative for thrombus.    Recommend outpatient cardiac workup once he has recovered from his acute stroke.    Pt was started on Norvasc, ASA, Lipitor, Coreg and Cozaar at time of discharge.    Pt needs to be scheduled for a NM Lexiscan and cardiology JIN OV as ordered.    Writer attempted to call pt for a cardiology post discharge phone call, but no answer. VM left to call back with any non emergent cardiac related or medication questions. Reminder left of need to schedule appt's as above. Scheduled and writer's phone numbers were provided. JESSICA Roberts RN.

## 2024-04-19 ENCOUNTER — TELEPHONE (OUTPATIENT)
Dept: CARDIOLOGY | Facility: CLINIC | Age: 69
End: 2024-04-19
Payer: MEDICARE

## 2024-04-19 DIAGNOSIS — R94.31 ABNORMAL ELECTROCARDIOGRAM (ECG) (EKG): ICD-10-CM

## 2024-04-19 DIAGNOSIS — R94.30 WALL MOTION ABNORMALITY OF INFERIOR WALL OF LEFT VENTRICLE: Primary | ICD-10-CM

## 2024-04-19 NOTE — TELEPHONE ENCOUNTER
[facility] Call Center    Phone Message    May a detailed message be left on voicemail: yes     Reason for Call: Other: Daughter states that ins is not covering the NM stress test with the current CPT code. She is wondering if this can be changed. Please call her back to discuss.      Action Taken: Other: cardiology    Travel Screening: Not Applicable  Thank you!  [facility]

## 2024-04-22 ENCOUNTER — TELEPHONE (OUTPATIENT)
Dept: NEUROLOGY | Facility: CLINIC | Age: 69
End: 2024-04-22
Payer: MEDICARE

## 2024-04-22 NOTE — TELEPHONE ENCOUNTER
Left Voicemail (1st Attempt) for the patient to call back and schedule the following:    Appointment type: New General Neuro   Provider: Dr. Calderon  Return date: next avail  Specialty phone number: [phone]  Additional appointment(s) needed:   Additonal Notes:     ** Please offer Virtual Visit with Dr. Calderon, first avail from referral**

## 2024-04-22 NOTE — TELEPHONE ENCOUNTER
Abnormal EKG added to diagnosis code  Message sent to central prior authorization team    Spoke with patient's daughter. She states when she tried to schedule the nuclear study she was told it was flagged as not being covered. She will try again in a couple of days.    Awaiting update from  Financial

## 2024-04-23 NOTE — TELEPHONE ENCOUNTER
Spoke with patient's daughter, she will schedule the nuclear study so the Finance team can work on the PA for the test.

## 2024-04-24 ENCOUNTER — TELEPHONE (OUTPATIENT)
Dept: CARDIOLOGY | Facility: CLINIC | Age: 69
End: 2024-04-24
Payer: MEDICARE

## 2024-04-24 ENCOUNTER — TELEPHONE (OUTPATIENT)
Dept: NEUROLOGY | Facility: CLINIC | Age: 69
End: 2024-04-24
Payer: MEDICARE

## 2024-04-24 NOTE — TELEPHONE ENCOUNTER
[facility] Call Center    Phone Message    May a detailed message be left on voicemail: yes     Reason for Call: Other: Daughter called on behalf of the patient to schedule a NM Lexiscan stress test (nuc card). Please call back to further coordinate.     Action Taken: Message routed to:  Other: Cardiology    Travel Screening: Not Applicable    Thank you!  [facility]

## 2024-04-24 NOTE — TELEPHONE ENCOUNTER
Left Voicemail (2nd Attempt) for the patient to call back and schedule the following:    Appointment type: New General Neuro   Provider: Any  Return date: next avail  Specialty phone number: [phone]  Additional appointment(s) needed:   Additonal Notes:    ** Per Sal EMT, you can offer the pt a virtual appt with Dr. Calderon from the pt's referral. **    
04-Jun-2019 01:39

## 2024-04-25 NOTE — TELEPHONE ENCOUNTER
4/25/24 Pt daughter was transfer to schedule NM Ashli but was not able to schedule pt due to ABN pt daughter req team to reach back out to her about new order/testing.    Venita

## 2024-05-06 ENCOUNTER — HOSPITAL ENCOUNTER (OUTPATIENT)
Dept: CARDIOLOGY | Facility: CLINIC | Age: 69
Discharge: HOME | End: 2024-05-06
Attending: INTERNAL MEDICINE
Payer: MEDICARE

## 2024-05-06 VITALS
WEIGHT: 192.6 LBS | HEART RATE: 61 BPM | BODY MASS INDEX: 27.57 KG/M2 | HEIGHT: 70 IN | DIASTOLIC BLOOD PRESSURE: 76 MMHG | OXYGEN SATURATION: 98 % | SYSTOLIC BLOOD PRESSURE: 142 MMHG

## 2024-05-06 DIAGNOSIS — R94.31 ABNORMAL ELECTROCARDIOGRAM (ECG) (EKG): ICD-10-CM

## 2024-05-06 DIAGNOSIS — R94.30 WALL MOTION ABNORMALITY OF INFERIOR WALL OF LEFT VENTRICLE: ICD-10-CM

## 2024-05-06 LAB
CV STRESS MAX HR HE: 62
NUC STRESS EJECTION FRACTION: 44 %
RATE PRESSURE PRODUCT: 5456
STRESS ECHO BASELINE DIASTOLIC HE: 76
STRESS ECHO BASELINE HR: 61 BPM
STRESS ECHO BASELINE SYSTOLIC BP: 142
STRESS ECHO CALCULATED PERCENT HR: 41 %
STRESS ECHO LAST STRESS DIASTOLIC BP: 62
STRESS ECHO LAST STRESS SYSTOLIC BP: 88
STRESS ECHO TARGET HR: 150

## 2024-05-06 PROCEDURE — 78452 HT MUSCLE IMAGE SPECT MULT: CPT | Mod: MG

## 2024-05-06 PROCEDURE — G1010 CDSM STANSON: HCPCS | Performed by: INTERNAL MEDICINE

## 2024-05-06 PROCEDURE — 250N000011 HC RX IP 250 OP 636: Mod: JZ | Performed by: INTERNAL MEDICINE

## 2024-05-06 PROCEDURE — 343N000001 HC RX 343: Performed by: INTERNAL MEDICINE

## 2024-05-06 PROCEDURE — 93018 CV STRESS TEST I&R ONLY: CPT | Performed by: INTERNAL MEDICINE

## 2024-05-06 PROCEDURE — 93016 CV STRESS TEST SUPVJ ONLY: CPT | Performed by: INTERNAL MEDICINE

## 2024-05-06 PROCEDURE — A9502 TC99M TETROFOSMIN: HCPCS | Performed by: INTERNAL MEDICINE

## 2024-05-06 PROCEDURE — 78452 HT MUSCLE IMAGE SPECT MULT: CPT | Mod: 26 | Performed by: INTERNAL MEDICINE

## 2024-05-06 RX ORDER — CAFFEINE CITRATE 20 MG/ML
60 SOLUTION INTRAVENOUS
Status: DISCONTINUED | OUTPATIENT
Start: 2024-05-06 | End: 2024-05-07 | Stop reason: HOSPADM

## 2024-05-06 RX ORDER — ACYCLOVIR 200 MG/1
0-1 CAPSULE ORAL
Status: DISCONTINUED | OUTPATIENT
Start: 2024-05-06 | End: 2024-05-07 | Stop reason: HOSPADM

## 2024-05-06 RX ORDER — AMINOPHYLLINE 25 MG/ML
50-100 INJECTION, SOLUTION INTRAVENOUS
Status: COMPLETED | OUTPATIENT
Start: 2024-05-06 | End: 2024-05-06

## 2024-05-06 RX ORDER — ALBUTEROL SULFATE 90 UG/1
2 AEROSOL, METERED RESPIRATORY (INHALATION) EVERY 5 MIN PRN
Status: DISCONTINUED | OUTPATIENT
Start: 2024-05-06 | End: 2024-05-07 | Stop reason: HOSPADM

## 2024-05-06 RX ORDER — REGADENOSON 0.08 MG/ML
0.4 INJECTION, SOLUTION INTRAVENOUS ONCE
Status: COMPLETED | OUTPATIENT
Start: 2024-05-06 | End: 2024-05-06

## 2024-05-06 RX ADMIN — TETROFOSMIN 3.9 MILLICURIE: 1.38 INJECTION, POWDER, LYOPHILIZED, FOR SOLUTION INTRAVENOUS at 09:00

## 2024-05-06 RX ADMIN — REGADENOSON 0.4 MG: 0.08 INJECTION, SOLUTION INTRAVENOUS at 10:39

## 2024-05-06 RX ADMIN — TETROFOSMIN 11.61 MILLICURIE: 1.38 INJECTION, POWDER, LYOPHILIZED, FOR SOLUTION INTRAVENOUS at 10:49

## 2024-05-06 RX ADMIN — AMINOPHYLLINE 50 MG: 25 INJECTION, SOLUTION INTRAVENOUS at 10:47

## 2024-05-07 ENCOUNTER — TELEPHONE (OUTPATIENT)
Dept: CARDIOLOGY | Facility: CLINIC | Age: 69
End: 2024-05-07
Payer: MEDICARE

## 2024-05-07 NOTE — TELEPHONE ENCOUNTER
Message left to return call. Test results.     Dr. Suresh's message reply -     LVEF unchanged from prior.  Ischemia not significant.  Follow-up with JIN as scheduled.    Thanks.  Dr. Suresh       JIN F/Up OV 6-24-24.

## 2024-05-07 NOTE — TELEPHONE ENCOUNTER
Stress test routed to Dr Suresh to review, ordered after recent echo showed WMA's and decreased LV systolic function. Pt discharged 4/10/24 after admission for acute CVA. JIN follow up is 6/24/24.         The nuclear stress test is abnormal.    There is a small area of nontransmural infarction in the mid to basal inferior and inferolateral segment(s) of the left ventricle associated with a mild degree of shannan-infarct ischemia.    Left ventricular function is mildly reduced.    The left ventricular ejection fraction at stress is 44%.    There is no prior study for comparison.

## 2024-05-07 NOTE — LETTER
May 9, 2024       TO: Chucho Ac   [address]   [address]  [place] [address]       Cat, Mr. Ac,    Dr. Suresh reviewed the stress test results. Your pumping function is the same. The results are stable.    Your next appointment is on 6/24/2024 at our [facility] clinic with JIN Kavita Patel at 1:00 PM.    Thank you  Team 2 R.N.s  [phone]  [facility]

## 2024-05-09 NOTE — TELEPHONE ENCOUNTER
Unable to reach patient by phone. Results letter sent.    Mr. Osorio Shelton Dr. Jain reviewed the stress test results. Your pumping function is the same. The results are stable.    Your next appointment is on 6/24/2024 at our [facility] clinic with JIN Kavita Patel at 1:00 PM.    Thank you  Team 2 R.N.s  [phone]

## 2024-06-03 NOTE — TELEPHONE ENCOUNTER
RECORDS RECEIVED FROM: Care Everywhere   REASON FOR VISIT: Acute ischemic stroke   PROVIDER: Kenan Calderon DO   DATE OF APPT: 6/18/24 @ 8:00 am    NOTES (FOR ALL VISITS) STATUS DETAILS   OFFICE NOTE from referring provider Internal Hosp Referral   OFFICE NOTE from other specialist Care Everywhere 5/16/24, 4/18/24 Owen Potts MD    @[email or web address]     DISCHARGE SUMMARY from hospital Internal 4/7/24-4/10/24 Mirtha Pinzon MD @[facility]     MEDICATION LIST Internal    IMAGING  (FOR ALL VISITS)     MRI (HEAD, NECK, SPINE) Internal [facility]  4/7/24 MR Brain  4/7/24 MRA Neck (Carotid)  4/7/24 MRA Brain (COW)

## 2024-06-18 ENCOUNTER — PRE VISIT (OUTPATIENT)
Dept: NEUROLOGY | Facility: CLINIC | Age: 69
End: 2024-06-18

## 2024-06-24 ENCOUNTER — OFFICE VISIT (OUTPATIENT)
Dept: CARDIOLOGY | Facility: CLINIC | Age: 69
End: 2024-06-24
Attending: INTERNAL MEDICINE
Payer: MEDICARE

## 2024-06-24 VITALS
SYSTOLIC BLOOD PRESSURE: 155 MMHG | HEART RATE: 65 BPM | RESPIRATION RATE: 15 BRPM | OXYGEN SATURATION: 100 % | DIASTOLIC BLOOD PRESSURE: 80 MMHG | BODY MASS INDEX: 27.98 KG/M2 | WEIGHT: 195 LBS

## 2024-06-24 DIAGNOSIS — E78.5 HYPERLIPIDEMIA LDL GOAL <100: ICD-10-CM

## 2024-06-24 DIAGNOSIS — R94.39 ABNORMAL STRESS TEST: ICD-10-CM

## 2024-06-24 DIAGNOSIS — I50.22 HEART FAILURE WITH MILDLY REDUCED EJECTION FRACTION (H): ICD-10-CM

## 2024-06-24 DIAGNOSIS — I77.819 AORTIC DILATATION (H): ICD-10-CM

## 2024-06-24 DIAGNOSIS — I10 BENIGN ESSENTIAL HYPERTENSION: Primary | ICD-10-CM

## 2024-06-24 DIAGNOSIS — I63.9 ACUTE ISCHEMIC STROKE (H): ICD-10-CM

## 2024-06-24 PROBLEM — I69.351: Status: ACTIVE | Noted: 2024-06-24

## 2024-06-24 PROBLEM — I50.9 CHF (CONGESTIVE HEART FAILURE) (H): Status: ACTIVE | Noted: 2024-06-24

## 2024-06-24 PROCEDURE — 99214 OFFICE O/P EST MOD 30 MIN: CPT

## 2024-06-24 RX ORDER — AMLODIPINE BESYLATE 10 MG/1
10 TABLET ORAL DAILY
Qty: 30 TABLET | Refills: 3 | Status: SHIPPED | OUTPATIENT
Start: 2024-06-24

## 2024-06-24 NOTE — PROGRESS NOTES
~Cardiology Clinic Visit~    Chucho Ac MRN# [id number]   YOB: 1955 Age: 68 year old   Primary Cardiologist: Dr. Suresh           Assessment and Plan:   Chucho Ac is a very pleasant 68 year old male who is here today for post hospital follow up      HTN, uncontrolled   Ischemic stroke   Hyperlipidemia   Lipid panel 4/2024-total cholesterol 181, triglycerides 199,   Continue atorvastatin 80 mg daily  Check lipid panel at next office visit   Type II diabetes   Ascending aorta dilatation, 4.1 cm, noted on echo 4/2024  Abnormal stress test   Echo 4/2024-moderate to severe left ventricular hypertrophy, LVEF 45-50%, mid inferior and inferolateral hypokinesis  Lexiscan stress test 5/2024-abnormal, there is a small area of nontransmural infarction in the mid to basal inferior and inferolateral segments of the left ventricle associated with mild degree of shannan-infarct ischemia  Heart failure with mildly reduced ejection fraction  -LVEF: 45-50%  -Fluid Status: Euvolemic  -Diuretic Regimen: None  -Ischemic Eval: Noted above  -GDMT:  Beta blocker: Carvedilol 6.25 mg twice daily  ACEI/ARB/ARNI: Losartan 100 mg daily  Aldactone antagonist: None  SGLT2 inhibitor: None     Overall, patient appearing stable from a cardiovascular standpoint.  There is no evidence for chest pain or heart failure.    Abnormal Lexiscan reviewed by primary cardiologist, LVEF is unchanged from LVEF noted on echocardiogram.  Ischemia is not significant.  No further workup indicated.    Blood pressure not well controlled. Increase amlodipine to 10 mg daily. Will have nursing call patient in two weeks to check in with at home blood pressure readings.     Patient due for lipid panel recheck at next office visit.    Will reach out to clinic pharmacy for pricing of Jardiance to optimize GDMT with mildly reduced ejection fraction.    Follow up with me in 3 months       Kavita Patel PA-C  Physician Assistant   [facility]  [facility]  Pager: [phone]          History of Presenting Illness:    Chucho Ac is a very pleasant 68 year old male with a history of ischemic stroke, uncontrolled hypertension, type 2 diabetes, and hyperlipidemia.    In brief, patient was admitted at [facility] from 4/7/2024-4/10/2024 for a stroke. Cardiology was consulted as echo demonstrated LVEF 45-50% with wall motion abnormalities. His troponin was mildly elevated. Due to his stroke, heparin and anticoagulation were contraindicated and there was not acute clinical indication for coronary angiogram so further ischemic evaluation was recommended in the outpatient setting.     Patient reports feeling fine.  He is accompanied with his daughter.  He has been walking twice a week for 30 minutes and denies any chest pain or shortness of breath. Denies palpitations, lightheadedness, dizziness, near syncope and syncope. Denies epistaxis, ecchymosis, and melena.     Taking medications daily as prescribed.      Blood pressure 155/80 and HR 65 in clinic today. Blood pressure at home -150. Rechecked during OV, 148/72.     Diet- does not add additional salt to meals           Social History       Social History     Socioeconomic History    Marital status:      Spouse name: Not on file    Number of children: 6    Years of education: Not on file    Highest education level: Not on file   Occupational History     Employer: [employer]   Tobacco Use    Smoking status: Never    Smokeless tobacco: Not on file   Substance and Sexual Activity    Alcohol use: No    Drug use: No    Sexual activity: Yes     Partners: Female   Other Topics Concern    Not on file   Social History Narrative    Native of [place], in [place]     Social Determinants of Health     Financial Resource Strain: Not on file   Food Insecurity: Not on file   Transportation Needs: Not on file   Physical Activity: Not on file   Stress: Not on  file   Social Connections: Not on file   Interpersonal Safety: Not on file   Housing Stability: Low Risk  (3/14/2022)    Received from [employer] [employer]    Housing Stability Vital Sign     Unable to Pay for Housing in the Last Year: No     Number of Places Lived in the Last Year: 1     In the last 12 months, was there a time when you did not have a steady place to sleep or slept in a shelter (including now)?: No            Review of Systems:   Please see HPI         Physical Exam:   Vitals: There were no vitals taken for this visit.   Wt Readings from Last 4 Encounters:   05/06/24 87.4 kg (192 lb 9.6 oz)   04/07/24 99.8 kg (220 lb)   12/16/11 99.3 kg (219 lb)   11/18/11 102.5 kg (226 lb)     GEN: well nourished, in no acute distress.  NECK: Supple. JVP was not appreciated.  C/V:  Regular rate and rhythm, no murmur, rub or gallop.    RESP: Respirations are unlabored. Clear to auscultation bilaterally without wheezing, rales, or rhonchi.  EXTREM: Bilateral lower extremities with no edema.   SKIN: Warm and dry.        Data:   LIPID RESULTS:  Lab Results   Component Value Date    CHOL 181 04/07/2024    CHOL 181 12/02/2011    HDL 28 (L) 04/07/2024    HDL 28 (L) 12/02/2011     (H) 04/07/2024    LDL 93 12/02/2011    TRIG 199 (H) 04/07/2024    TRIG 299 (H) 12/02/2011    CHOLHDLRATIO 6.4 (H) 12/02/2011     LIVER ENZYME RESULTS:  Lab Results   Component Value Date    AST 29 11/18/2011    ALT 22 11/18/2011     CBC RESULTS:  Lab Results   Component Value Date    WBC 4.2 04/08/2024    RBC 4.87 04/08/2024    HGB 12.9 (L) 04/08/2024    HCT 39.4 (L) 04/08/2024    MCV 81 04/08/2024    MCH 26.5 04/08/2024    MCHC 32.7 04/08/2024    RDW 12.7 04/08/2024     (L) 04/08/2024     BMP RESULTS:  Lab Results   Component Value Date     04/08/2024     12/02/2011    POTASSIUM 4.1 04/10/2024    POTASSIUM 3.9 12/02/2011    CHLORIDE 98 04/08/2024    CHLORIDE 103 12/02/2011    CO2 26 04/08/2024    CO2 24 12/02/2011    ANIONGAP  "13 2024    ANIONGAP 15 2011     (H) 04/10/2024     (H) 2011    BUN 12.7 2024    BUN 14 2011    CR 1.19 (H) 04/10/2024    CR 1.22 2011    GFRESTIMATED 66 04/10/2024    GFRESTIMATED 61 2011    GFRESTBLACK 74 2011    LEN 9.4 2024    LEN 9.3 2011      A1C RESULTS:  Lab Results   Component Value Date    A1C 7.3 (H) 2024     INR RESULTS:  Lab Results   Component Value Date    INR 1.07 2024            Medications     Current Outpatient Medications   Medication Sig Dispense Refill    amLODIPine (NORVASC) 5 MG tablet Take 1 tablet (5 mg) by mouth daily 90 tablet 0    aspirin (ASA) 325 MG EC tablet Take 1 tablet (325 mg) by mouth daily 90 tablet 0    atorvastatin (LIPITOR) 80 MG tablet Take 1 tablet (80 mg) by mouth every evening 90 tablet 0    Blood Pressure KIT Check BP daily 1 kit 0    carvedilol (COREG) 6.25 MG tablet Take 1 tablet (6.25 mg) by mouth 2 times daily (with meals) 90 tablet 0    losartan (COZAAR) 100 MG tablet Take 1 tablet (100 mg) by mouth daily 90 tablet 0    metFORMIN (GLUCOPHAGE) 1000 MG tablet Take 1 tablet (1,000 mg) by mouth 2 times daily (with meals) 90 tablet 0          Past Medical History     Past Medical History:   Diagnosis Date    Benign essential hypertension     Hypertriglyceridemia     Mixed dyslipidemia     Obesity     Type 2 diabetes mellitus (H)      Past Surgical History:   Procedure Laterality Date    NO HISTORY OF SURGERY       Family History   Problem Relation Age of Onset    Family History Negative Mother          in 70s    Family History Negative Father          in 70s of \"natural causes\"    Coronary Artery Disease No family hx of     Cardiomyopathy No family hx of             Allergies   Patient has no known allergies.      This note was completed in part using dictation via the Dragon voice recognition software. Some word and grammatical errors may occur and must be interpreted in the " appropriate clinical context.  If there are any questions pertaining to this issue, please contact me for further clarification.

## 2024-06-24 NOTE — PATIENT INSTRUCTIONS
Thank you for your visit with the [facility] today.    Today's plan:   Medication changes: increase amlodipine to 10 mg daily   Check your blood pressure at home. My nursing team will call you in 2 weeks to check in with your at home blood pressure readings   Check your blood pressure 1-2 hours after taking your morning blood pressure medications   Follow up with me in 3 months     If you have questions or concerns please call the nurse team at [phone] or send a [employer] message.     Scheduling phone number: [phone]    It was a pleasure seeing you today!     Kavita Patel PA-C   Physician Assistant   [facility]

## 2024-06-24 NOTE — LETTER
6/24/2024    Bruce A Olmscheid, MD [facility] [place] [address]  [place] [address]    RE: Chucho Ac       Dear Colleague,     I had the pleasure of seeing Chucho Ac in the [facility].              ~Cardiology Clinic Visit~    Chucho Ac MRN# [id number]   YOB: 1955 Age: 68 year old   Primary Cardiologist: Dr. Suresh           Assessment and Plan:   Chucho Ac is a very pleasant 68 year old male who is here today for post hospital follow up      HTN, uncontrolled   Ischemic stroke   Hyperlipidemia   Lipid panel 4/2024-total cholesterol 181, triglycerides 199,   Continue atorvastatin 80 mg daily  Check lipid panel at next office visit   Type II diabetes   Ascending aorta dilatation, 4.1 cm, noted on echo 4/2024  Abnormal stress test   Echo 4/2024-moderate to severe left ventricular hypertrophy, LVEF 45-50%, mid inferior and inferolateral hypokinesis  Lexiscan stress test 5/2024-abnormal, there is a small area of nontransmural infarction in the mid to basal inferior and inferolateral segments of the left ventricle associated with mild degree of shannan-infarct ischemia  Heart failure with mildly reduced ejection fraction  -LVEF: 45-50%  -Fluid Status: Euvolemic  -Diuretic Regimen: None  -Ischemic Eval: Noted above  -GDMT:  Beta blocker: Carvedilol 6.25 mg twice daily  ACEI/ARB/ARNI: Losartan 100 mg daily  Aldactone antagonist: None  SGLT2 inhibitor: None     Overall, patient appearing stable from a cardiovascular standpoint.  There is no evidence for chest pain or heart failure.    Abnormal Lexiscan reviewed by primary cardiologist, LVEF is unchanged from LVEF noted on echocardiogram.  Ischemia is not significant.  No further workup indicated.    Blood pressure not well controlled. Increase amlodipine to 10 mg daily. Will have nursing call patient in two weeks to check in with at home blood pressure readings.     Patient due for lipid panel  recheck at next office visit.    Will reach out to clinic pharmacy for pricing of Jardiance to optimize GDMT with mildly reduced ejection fraction.    Follow up with me in 3 months       Kavita Patel PA-C  Physician Assistant   [facility]  Pager: [phone]          History of Presenting Illness:    Chucho Ac is a very pleasant 68 year old male with a history of ischemic stroke, uncontrolled hypertension, type 2 diabetes, and hyperlipidemia.    In brief, patient was admitted at [facility] from 4/7/2024-4/10/2024 for a stroke. Cardiology was consulted as echo demonstrated LVEF 45-50% with wall motion abnormalities. His troponin was mildly elevated. Due to his stroke, heparin and anticoagulation were contraindicated and there was not acute clinical indication for coronary angiogram so further ischemic evaluation was recommended in the outpatient setting.     Patient reports feeling fine.  He is accompanied with his daughter.  He has been walking twice a week for 30 minutes and denies any chest pain or shortness of breath. Denies palpitations, lightheadedness, dizziness, near syncope and syncope. Denies epistaxis, ecchymosis, and melena.     Taking medications daily as prescribed.      Blood pressure 155/80 and HR 65 in clinic today. Blood pressure at home -150. Rechecked during OV, 148/72.     Diet- does not add additional salt to meals           Social History       Social History     Socioeconomic History    Marital status:      Spouse name: Not on file    Number of children: 6    Years of education: Not on file    Highest education level: Not on file   Occupational History     Employer: [employer]   Tobacco Use    Smoking status: Never    Smokeless tobacco: Not on file   Substance and Sexual Activity    Alcohol use: No    Drug use: No    Sexual activity: Yes     Partners: Female   Other Topics Concern    Not on file   Social History  Narrative    Native of [place] [employer] [place] in [place]     Social Determinants of Health     Financial Resource Strain: Not on file   Food Insecurity: Not on file   Transportation Needs: Not on file   Physical Activity: Not on file   Stress: Not on file   Social Connections: Not on file   Interpersonal Safety: Not on file   Housing Stability: Low Risk  (3/14/2022)    Received from [facility]    Housing Stability Vital Sign     Unable to Pay for Housing in the Last Year: No     Number of Places Lived in the Last Year: 1     In the last 12 months, was there a time when you did not have a steady place to sleep or slept in a shelter (including now)?: No            Review of Systems:   Please see HPI         Physical Exam:   Vitals: There were no vitals taken for this visit.   Wt Readings from Last 4 Encounters:   05/06/24 87.4 kg (192 lb 9.6 oz)   04/07/24 99.8 kg (220 lb)   12/16/11 99.3 kg (219 lb)   11/18/11 102.5 kg (226 lb)     GEN: well nourished, in no acute distress.  NECK: Supple. JVP was not appreciated.  C/V:  Regular rate and rhythm, no murmur, rub or gallop.    RESP: Respirations are unlabored. Clear to auscultation bilaterally without wheezing, rales, or rhonchi.  EXTREM: Bilateral lower extremities with no edema.   SKIN: Warm and dry.        Data:   LIPID RESULTS:  Lab Results   Component Value Date    CHOL 181 04/07/2024    CHOL 181 12/02/2011    HDL 28 (L) 04/07/2024    HDL 28 (L) 12/02/2011     (H) 04/07/2024    LDL 93 12/02/2011    TRIG 199 (H) 04/07/2024    TRIG 299 (H) 12/02/2011    CHOLHDLRATIO 6.4 (H) 12/02/2011     LIVER ENZYME RESULTS:  Lab Results   Component Value Date    AST 29 11/18/2011    ALT 22 11/18/2011     CBC RESULTS:  Lab Results   Component Value Date    WBC 4.2 04/08/2024    RBC 4.87 04/08/2024    HGB 12.9 (L) 04/08/2024    HCT 39.4 (L) 04/08/2024    MCV 81 04/08/2024    MCH 26.5 04/08/2024    MCHC 32.7 04/08/2024    RDW 12.7 04/08/2024     (L) 04/08/2024     BMP  "RESULTS:  Lab Results   Component Value Date     2024     2011    POTASSIUM 4.1 04/10/2024    POTASSIUM 3.9 2011    CHLORIDE 98 2024    CHLORIDE 103 2011    CO2 26 2024    CO2 24 2011    ANIONGAP 13 2024    ANIONGAP 15 2011     (H) 04/10/2024     (H) 2011    BUN 12.7 2024    BUN 14 2011    CR 1.19 (H) 04/10/2024    CR 1.22 2011    GFRESTIMATED 66 04/10/2024    GFRESTIMATED 61 2011    GFRESTBLACK 74 2011    LEN 9.4 2024    LEN 9.3 2011      A1C RESULTS:  Lab Results   Component Value Date    A1C 7.3 (H) 2024     INR RESULTS:  Lab Results   Component Value Date    INR 1.07 2024            Medications     Current Outpatient Medications   Medication Sig Dispense Refill    amLODIPine (NORVASC) 5 MG tablet Take 1 tablet (5 mg) by mouth daily 90 tablet 0    aspirin (ASA) 325 MG EC tablet Take 1 tablet (325 mg) by mouth daily 90 tablet 0    atorvastatin (LIPITOR) 80 MG tablet Take 1 tablet (80 mg) by mouth every evening 90 tablet 0    Blood Pressure KIT Check BP daily 1 kit 0    carvedilol (COREG) 6.25 MG tablet Take 1 tablet (6.25 mg) by mouth 2 times daily (with meals) 90 tablet 0    losartan (COZAAR) 100 MG tablet Take 1 tablet (100 mg) by mouth daily 90 tablet 0    metFORMIN (GLUCOPHAGE) 1000 MG tablet Take 1 tablet (1,000 mg) by mouth 2 times daily (with meals) 90 tablet 0          Past Medical History     Past Medical History:   Diagnosis Date    Benign essential hypertension     Hypertriglyceridemia     Mixed dyslipidemia     Obesity     Type 2 diabetes mellitus (H)      Past Surgical History:   Procedure Laterality Date    NO HISTORY OF SURGERY       Family History   Problem Relation Age of Onset    Family History Negative Mother          in 70s    Family History Negative Father          in 70s of \"natural causes\"    Coronary Artery Disease No family hx of     " Cardiomyopathy No family hx of             Allergies   Patient has no known allergies.      This note was completed in part using dictation via the Dragon voice recognition software. Some word and grammatical errors may occur and must be interpreted in the appropriate clinical context.  If there are any questions pertaining to this issue, please contact me for further clarification.    Thank you for allowing me to participate in the care of your patient.      Sincerely,     Kavita aPtel PA-C     [facility]  cc:   Ulices Suresh MD  [address] [place] [id number]

## 2024-07-08 ENCOUNTER — TELEPHONE (OUTPATIENT)
Dept: CARDIOLOGY | Facility: CLINIC | Age: 69
End: 2024-07-08
Payer: MEDICARE

## 2024-07-08 DIAGNOSIS — I10 BENIGN ESSENTIAL HYPERTENSION: Primary | ICD-10-CM

## 2024-07-08 NOTE — TELEPHONE ENCOUNTER
----- Message from Adela MCCARTY sent at 6/24/2024  1:55 PM CDT -----  Regarding: Call today    ----- Message -----  From: Kavita Patel PA-C  Sent: 6/24/2024   1:30 PM CDT  To: [facility] Team 2    Can you please call patient in 2 weeks to check in on his at home blood pressure readings?     Thanks!    --------------------------------------------------------------------------------------    Writer called patient to follow up on BP readings following amlodipine increase to 10 mg every day at appointment with Kanwal Patel PA-C on 6/24/24. No answer and VM left asking patient to return call to team 2 nurse line.

## 2024-07-09 NOTE — TELEPHONE ENCOUNTER
Spoke to patient, says his BP today was 157/77. He does not have any other readings to report. He denies any side effects ie peripheral edema on the 10mg dose of amlodipine. Routed to Kavita to review.

## 2024-07-09 NOTE — TELEPHONE ENCOUNTER
Kavita Patel PA-C Hiljus, Audrey G, RN  Cc: ANAM [facility] Heart Team 2  Caller: Unspecified (Yesterday, 10:06 AM)    Please order a renin-aldosterone ratio lab.    If lab value is normal, recommend patient start spironolactone 12.5 mg daily.    Keep checking blood pressure at home, update the clinic if readings are greater than 140 systolic.    Thanks!      Called patient to review recommendations from Kavita. He asked that we contact his daughter to review the recommendations with her. Left a message to call back and discuss.

## 2024-07-17 NOTE — TELEPHONE ENCOUNTER
Spoke to patient's daughter, reviewed recommendation for renin/aldosterone labs. Scheduling number provided.

## 2024-07-23 NOTE — TELEPHONE ENCOUNTER
[facility]    Phone Message    May a detailed message be left on voicemail: yes     Reason for Call: Other: FYI, patient coming in for labs 7/24/24 to [facility].      Action Taken: Other: cardiology    Travel Screening: Not Applicable  Thank you!  [facility]       Date of Service:

## 2024-07-24 ENCOUNTER — LAB (OUTPATIENT)
Dept: LAB | Facility: CLINIC | Age: 69
End: 2024-07-24
Payer: MEDICARE

## 2024-07-24 DIAGNOSIS — I10 BENIGN ESSENTIAL HYPERTENSION: ICD-10-CM

## 2024-07-24 PROCEDURE — 36415 COLL VENOUS BLD VENIPUNCTURE: CPT

## 2024-07-24 PROCEDURE — 82088 ASSAY OF ALDOSTERONE: CPT

## 2024-07-24 PROCEDURE — 99000 SPECIMEN HANDLING OFFICE-LAB: CPT

## 2024-07-24 PROCEDURE — 84244 ASSAY OF RENIN: CPT | Mod: 90

## 2024-07-26 ENCOUNTER — TELEPHONE (OUTPATIENT)
Dept: CARDIOLOGY | Facility: CLINIC | Age: 69
End: 2024-07-26
Payer: MEDICARE

## 2024-07-26 DIAGNOSIS — I10 HTN (HYPERTENSION): ICD-10-CM

## 2024-07-26 DIAGNOSIS — R79.89 ABNORMAL ALDOSTERONE TO RENIN RATIO: Primary | ICD-10-CM

## 2024-07-26 LAB
ALDOST SERPL-MCNC: 10.9 NG/DL (ref 0–31)
ALDOST/RENIN PLAS-RTO: 109 {RATIO} (ref 0–25)
RENIN PLAS-CCNC: 0.1 NG/ML/HR

## 2024-07-26 NOTE — LETTER
"  August 1, 2024       TO: Chucho Ac   [address]  [facility] [address]       Dear Mr. Ac,    The results of your recent Laboratory tests.    Component      Latest Ref Rng 7/24/2024  9:47 AM   Aldosterone      0.0 - 31.0 ng/dL 10.9    Renin Activity      ng/mL/hr 0.1    Aldosterone Renin Ratio      0.0 - 25.0  109.0 (H)       Legend:  (H) High      We have not been able to reach you by phone. Kavita DUNCAN reviewed your results and said-     \"This elevated lab indicates that his adrenal glands are releasing excess aldosterone. High levels of aldosterone retain water and salt which can keep blood pressure elevated. Recommend patient start spironolactone 12.5 mg daily.\"     Please call us at [phone] so that we may order this medication for you. Kavita recommends rechecking labs 1 week after you start the new medication, to reassess your kidney function and electrolytes. She also recommends an ultrasound of your kidneys to check for renal artery stenosis. You may call scheduling at [phone] to set these up.     Additionally, she recommends a referral to endocrinology who specializes in primary aldosteronism. The number to set up a consult is [phone].     Please continue to check your blood pressures at home and update the clinic if >140 systolic.     Sincerely,    Team 2 Nurses   [facility]          "

## 2024-07-26 NOTE — TELEPHONE ENCOUNTER
Message from JIN Kavita Patel:  Kavita Patel PA-C  P [facility] Heart Team 2  Results reviewed. Please let patient know that this elevated lab indicates that his adrenal glands are releasing excess aldosterone. High levels of aldosterone retain water and salt which can keep blood pressure elevated.    Recommend patient start spironolactone 12.5 mg daily.    BMP check in 1 week.    Referral to endocrinology for work up for primary hyperaldosteronism that could be contributing to his elevated blood pressure.    Order renal artery ultrasound to rule out renal artery stenosis.    Encourage patient to continue to check blood pressures at home. Update the clinic if >140 systolic.  ======================================    Patient's next OV is 9/27/2024    Attempted to contact the patient's daughter with results and medication changes. Left a detailed message requesting a call back  Left message for patient to call back to Team 2 R.N.s @ [phone]     Order placed for endocrinology referral  Order placed for  renal  Order placed for bmp on 8/6/2024 (check date-adjust depending on medication start)

## 2024-08-01 NOTE — TELEPHONE ENCOUNTER
Max attempts made to reach patient/daughter regarding results and recommendations. [employer] not enabled. Letter sent.

## 2024-08-14 RX ORDER — SPIRONOLACTONE 25 MG/1
12.5 TABLET ORAL DAILY
Qty: 45 TABLET | Refills: 3 | Status: SHIPPED | OUTPATIENT
Start: 2024-08-14

## 2024-08-14 NOTE — TELEPHONE ENCOUNTER
Patient's daughter called back in response to the letter that was sent 8/1.     Spoke to Chelsy, she states they got the letter and were agreeable to the recommendations made by Kavita. Rx sent to pharmacy for spironolactone. They will call scheduling to set up the BMP/US and endocrinology follow up. They will continue to monitor his BP.

## 2024-08-26 ENCOUNTER — TELEPHONE (OUTPATIENT)
Dept: CARDIOLOGY | Facility: CLINIC | Age: 69
End: 2024-08-26

## 2024-08-26 ENCOUNTER — LAB (OUTPATIENT)
Dept: LAB | Facility: CLINIC | Age: 69
End: 2024-08-26
Payer: MEDICARE

## 2024-08-26 DIAGNOSIS — R79.89 ABNORMAL ALDOSTERONE TO RENIN RATIO: ICD-10-CM

## 2024-08-26 DIAGNOSIS — I10 HTN (HYPERTENSION): ICD-10-CM

## 2024-08-26 LAB
ANION GAP SERPL CALCULATED.3IONS-SCNC: 15 MMOL/L (ref 7–15)
BUN SERPL-MCNC: 18.6 MG/DL (ref 8–23)
CALCIUM SERPL-MCNC: 9.8 MG/DL (ref 8.8–10.4)
CHLORIDE SERPL-SCNC: 100 MMOL/L (ref 98–107)
CREAT SERPL-MCNC: 1 MG/DL (ref 0.67–1.17)
EGFRCR SERPLBLD CKD-EPI 2021: 81 ML/MIN/1.73M2
GLUCOSE SERPL-MCNC: 175 MG/DL (ref 70–99)
HCO3 SERPL-SCNC: 22 MMOL/L (ref 22–29)
POTASSIUM SERPL-SCNC: 4.4 MMOL/L (ref 3.4–5.3)
SODIUM SERPL-SCNC: 137 MMOL/L (ref 135–145)

## 2024-08-26 PROCEDURE — 80048 BASIC METABOLIC PNL TOTAL CA: CPT

## 2024-08-26 PROCEDURE — 36415 COLL VENOUS BLD VENIPUNCTURE: CPT

## 2024-08-26 NOTE — TELEPHONE ENCOUNTER
Message from JIN Kavita Patel:  Kavita Patel PA-C  P [facility] Heart Team 2  Please call and let patient know that his electrolytes and kidney function are stable after starting the spironolactone. Continue with current medication regimen. Continue monitoring blood pressure at home.      Patient is scheduled for  renal on 9/4/2024  Patient to see JIN Kavita Patel on 9/27/2024    1455 spoke with patient to review stable lab results and upcoming appointments. Patient verbalized understanding and agreed with plan.

## 2024-09-04 ENCOUNTER — HOSPITAL ENCOUNTER (OUTPATIENT)
Dept: ULTRASOUND IMAGING | Facility: CLINIC | Age: 69
Discharge: HOME | End: 2024-09-04
Payer: MEDICARE

## 2024-09-04 DIAGNOSIS — R79.89 ABNORMAL ALDOSTERONE TO RENIN RATIO: ICD-10-CM

## 2024-09-04 DIAGNOSIS — I10 HTN (HYPERTENSION): ICD-10-CM

## 2024-09-04 PROCEDURE — 76770 US EXAM ABDO BACK WALL COMP: CPT | Mod: 26 | Performed by: INTERNAL MEDICINE

## 2024-09-04 PROCEDURE — 93975 VASCULAR STUDY: CPT | Mod: 26 | Performed by: INTERNAL MEDICINE

## 2024-09-04 PROCEDURE — 93975 VASCULAR STUDY: CPT

## 2024-09-06 ENCOUNTER — TELEPHONE (OUTPATIENT)
Dept: CARDIOLOGY | Facility: CLINIC | Age: 69
End: 2024-09-06
Payer: MEDICARE

## 2024-09-06 NOTE — TELEPHONE ENCOUNTER
----- Message from Kavita Patel sent at 9/6/2024  3:01 PM CDT -----  Result reviewed.  Please let patient know that his ultrasound was negative for renal artery stenosis.  This is reassuring to help us rule out this diagnosis as a cause for his high blood pressure.    Thanks!      Called patient and left a detailed voicemail with his results/the message from Kavita. Reminded patient of follow up 9/27/24. Team 2 number provided to call back with any questions.

## 2024-09-27 ENCOUNTER — OFFICE VISIT (OUTPATIENT)
Dept: CARDIOLOGY | Facility: CLINIC | Age: 69
End: 2024-09-27
Payer: MEDICARE

## 2024-09-27 VITALS
DIASTOLIC BLOOD PRESSURE: 83 MMHG | HEIGHT: 70 IN | SYSTOLIC BLOOD PRESSURE: 162 MMHG | WEIGHT: 180.9 LBS | HEART RATE: 95 BPM | BODY MASS INDEX: 25.9 KG/M2 | OXYGEN SATURATION: 98 %

## 2024-09-27 DIAGNOSIS — I42.9 CARDIOMYOPATHY, UNSPECIFIED TYPE (H): ICD-10-CM

## 2024-09-27 DIAGNOSIS — E78.5 HYPERLIPIDEMIA LDL GOAL <100: ICD-10-CM

## 2024-09-27 DIAGNOSIS — I10 BENIGN ESSENTIAL HYPERTENSION: Primary | ICD-10-CM

## 2024-09-27 PROCEDURE — 99214 OFFICE O/P EST MOD 30 MIN: CPT

## 2024-09-27 NOTE — PROGRESS NOTES
~Cardiology Clinic Visit~    Chucho Ac MRN# [id number]   YOB: 1955 Age: 68 year old   Primary Cardiologist: Dr. Suresh           Assessment and Plan:   Chucho Ac is a very pleasant 68 year old male who is here today for blood pressure follow up      HTN  Sub optimal control during OV but appear well controlled at home   Renal US negative for renal artery stenosis   Primary hyperaldosteronism has not been ruled out, endocrinology referral placed  Continue amlodipine 10 mg, carvedilol 6.25 mg twice daily, losartan 100 mg daily, spironolactone 12.5 mg daily  Encouraged patient to continue checking blood pressure at home and to notify the         clinic if systolic blood pressure remains >130    Ischemic stroke   Follows with neurology     Hyperlipidemia   Lipid panel 4/2024-total cholesterol 181, triglycerides 199,   Continue atorvastatin 80 mg daily    Type II diabetes     Ascending aorta dilatation, 4.1 cm, noted on echo 4/2024    Abnormal stress test 5/2024 with nontransmural infarction in the inferior and inferolateral segments  Reviewed with primary cardiologist and no further work up indicated     Heart failure with mildly reduced ejection fraction  LVEF: 45-50%  Fluid Status: Euvolemic  Diuretic Regimen: None  Ischemic Eval: Noted above  GDMT:  Beta blocker: Carvedilol 6.25 mg twice daily  ACEI/ARB/ARNI: Losartan 100 mg daily  Aldactone antagonist: spironolactone 12.5 mg daily   SGLT2 inhibitor: None    Plan: continue with current medication regimen. Will reach out to clinic pharmacy for pricing of entresto and jardiance to optimize GDMT.     Endocrinology referral placed in July 2024. Patient never heard from the clinic to make appointment. Referral placed again today to rule out primary hyperaldosteronism.     Follow up with Dr. Suresh in 6 months with labs and echo prior       Kavita Patel PA-C  Physician Assistant   [facility]  Pager:  [phone]          History of Presenting Illness:    Chucho Ac is a very pleasant 68 year old male with a history of ischemic stroke, uncontrolled hypertension, type 2 diabetes, and hyperlipidemia.     In brief, patient was admitted at [facility] from 4/7/2024-4/10/2024 for a stroke. Cardiology was consulted as echo demonstrated LVEF 45-50% with wall motion abnormalities. His troponin was mildly elevated. Due to his stroke, heparin and anticoagulation were contraindicated and there was not acute clinical indication for coronary angiogram so further ischemic evaluation was recommended in the outpatient setting.     Lexiscan stress test done May 2024 was noted to be abnormal. There was a small area of nontransmural infarction in the mid to basal inferior and inferolateral segment(s) of the left ventricle associated with a mild degree of shannan-infarct ischemia.  Reviewed results with primary cardiologist and no further workup was indicated.    I had the pleasure of meeting Mr. Ac in June 2024.  His blood pressure was not well-controlled.  His amlodipine was increased to 10 mg daily.  Nursing was going to call him in 2 weeks to check-in on his and home blood pressure readings.     Blood pressure remained elevated at home 157 systolic. A renin-aldosterone level was checked and was elevated. He was referred for further workup for primary hyperaldosteronism with endocrinology.  He was started on spironolactone 12.5 mg daily.     Renal ultrasound done September 2024 was negative for renal artery stenosis.    Patient reports feeling well. He is accompanied by his daughter today. He has been checking his blood pressure at home and they have all been normal ranging from 103-130 systolic. He is following up with neurology next week.     Denies shortness of breath, orthopnea and PND. Denies chest pain, palpitations, lightheadedness, dizziness, near syncope and syncope.     Taking medications daily as  prescribed.      Blood pressure 162/83 and HR 95 in clinic today. Blood pressure at home 105-130 systolic. Rechecked during OV, 142/80.           Social History       Social History     Socioeconomic History    Marital status:      Spouse name: Not on file    Number of children: 6    Years of education: Not on file    Highest education level: Not on file   Occupational History     Employer: [employer]   Tobacco Use    Smoking status: Never    Smokeless tobacco: Not on file   Substance and Sexual Activity    Alcohol use: No    Drug use: No    Sexual activity: Yes     Partners: Female   Other Topics Concern    Not on file   Social History Narrative    Native of [facility] [place], in [place]     Social Determinants of Health     Financial Resource Strain: Not on file   Food Insecurity: Not on file   Transportation Needs: Not on file   Physical Activity: Not on file   Stress: Not on file   Social Connections: Not on file   Interpersonal Safety: Not on file   Housing Stability: Low Risk  (3/14/2022)    Received from [facility]    Housing Stability Vital Sign     Unable to Pay for Housing in the Last Year: No     Number of Places Lived in the Last Year: 1     Unstable Housing in the Last Year: No            Review of Systems:   Please see HPI         Physical Exam:   Vitals: There were no vitals taken for this visit.   Wt Readings from Last 4 Encounters:   06/24/24 88.5 kg (195 lb)   05/06/24 87.4 kg (192 lb 9.6 oz)   04/07/24 99.8 kg (220 lb)   12/16/11 99.3 kg (219 lb)     GEN: well nourished, in no acute distress.  NECK: Supple. JVP was not appreciated.  C/V:  Regular rate and rhythm, no murmur, rub or gallop.    RESP: Respirations are unlabored. Clear to auscultation bilaterally without wheezing, rales, or rhonchi.  EXTREM: Bilateral lower extremities with no edema.   SKIN: Warm and dry.        Data:   LIPID RESULTS:  Lab Results   Component Value Date    CHOL 181 04/07/2024    CHOL  181 12/02/2011    HDL 28 (L) 04/07/2024    HDL 28 (L) 12/02/2011     (H) 04/07/2024    LDL 93 12/02/2011    TRIG 199 (H) 04/07/2024    TRIG 299 (H) 12/02/2011    CHOLHDLRATIO 6.4 (H) 12/02/2011     LIVER ENZYME RESULTS:  Lab Results   Component Value Date    AST 29 11/18/2011    ALT 22 11/18/2011     CBC RESULTS:  Lab Results   Component Value Date    WBC 4.2 04/08/2024    RBC 4.87 04/08/2024    HGB 12.9 (L) 04/08/2024    HCT 39.4 (L) 04/08/2024    MCV 81 04/08/2024    MCH 26.5 04/08/2024    MCHC 32.7 04/08/2024    RDW 12.7 04/08/2024     (L) 04/08/2024     BMP RESULTS:  Lab Results   Component Value Date     08/26/2024     12/02/2011    POTASSIUM 4.4 08/26/2024    POTASSIUM 3.9 12/02/2011    CHLORIDE 100 08/26/2024    CHLORIDE 103 12/02/2011    CO2 22 08/26/2024    CO2 24 12/02/2011    ANIONGAP 15 08/26/2024    ANIONGAP 15 12/02/2011     (H) 08/26/2024     (H) 04/10/2024     (H) 12/02/2011    BUN 18.6 08/26/2024    BUN 14 12/02/2011    CR 1.00 08/26/2024    CR 1.22 12/02/2011    GFRESTIMATED 81 08/26/2024    GFRESTIMATED 61 12/02/2011    GFRESTBLACK 74 12/02/2011    LEN 9.8 08/26/2024    LEN 9.3 12/02/2011      A1C RESULTS:  Lab Results   Component Value Date    A1C 7.3 (H) 04/07/2024     INR RESULTS:  Lab Results   Component Value Date    INR 1.07 04/07/2024            Medications     Current Outpatient Medications   Medication Sig Dispense Refill    amLODIPine (NORVASC) 10 MG tablet Take 1 tablet (10 mg) by mouth daily 30 tablet 3    aspirin (ASA) 325 MG EC tablet Take 1 tablet (325 mg) by mouth daily 90 tablet 0    atorvastatin (LIPITOR) 80 MG tablet Take 1 tablet (80 mg) by mouth every evening 90 tablet 0    Blood Pressure KIT Check BP daily 1 kit 0    carvedilol (COREG) 6.25 MG tablet Take 1 tablet (6.25 mg) by mouth 2 times daily (with meals) 90 tablet 0    losartan (COZAAR) 100 MG tablet Take 1 tablet (100 mg) by mouth daily 90 tablet 0    metFORMIN (GLUCOPHAGE)  "1000 MG tablet Take 1 tablet (1,000 mg) by mouth 2 times daily (with meals) 90 tablet 0    spironolactone (ALDACTONE) 25 MG tablet Take 0.5 tablets (12.5 mg) by mouth daily 45 tablet 3          Past Medical History     Past Medical History:   Diagnosis Date    Benign essential hypertension     Hypertriglyceridemia     Mixed dyslipidemia     Obesity     Type 2 diabetes mellitus (H)      Past Surgical History:   Procedure Laterality Date    NO HISTORY OF SURGERY       Family History   Problem Relation Age of Onset    Family History Negative Mother          in 70s    Family History Negative Father          in 70s of \"natural causes\"    Coronary Artery Disease No family hx of     Cardiomyopathy No family hx of             Allergies   Patient has no known allergies.      This note was completed in part using dictation via the Dragon voice recognition software. Some word and grammatical errors may occur and must be interpreted in the appropriate clinical context.  If there are any questions pertaining to this issue, please contact me for further clarification.  "

## 2024-09-27 NOTE — PATIENT INSTRUCTIONS
Thank you for your visit with the [facility] today.    Today's plan:   Medication changes: none  Check your blood pressure at home. If the top numbers remains >130, please call and let the clinic know.   Schedule appointment with endocrinology   Follow up with Dr. Suresh in Spring 2025 with echo prior           If you have questions or concerns please call the nurse team at [phone] or send a [employer]sl[employer] message.     Scheduling phone number: [phone]    It was a pleasure seeing you today!     Kavita Patel PA-C   Physician Assistant   [facility]

## 2024-09-27 NOTE — LETTER
9/27/2024    Bruce, [place], MD [facility] [place] [address]  [place] [address]    RE: Chucho Ac       Dear Colleague,     I had the pleasure of seeing Chucho Ac in the [facility].              ~Cardiology Clinic Visit~    Chucho Ac MRN# [id number]   YOB: 1955 Age: 68 year old   Primary Cardiologist: Dr. Suresh           Assessment and Plan:   Chucho Ac is a very pleasant 68 year old male who is here today for blood pressure follow up      HTN  Sub optimal control during OV but appear well controlled at home   Renal US negative for renal artery stenosis   Primary hyperaldosteronism has not been ruled out, endocrinology referral placed  Continue amlodipine 10 mg, carvedilol 6.25 mg twice daily, losartan 100 mg daily, spironolactone 12.5 mg daily  Encouraged patient to continue checking blood pressure at home and to notify the         clinic if systolic blood pressure remains >130    Ischemic stroke   Follows with neurology     Hyperlipidemia   Lipid panel 4/2024-total cholesterol 181, triglycerides 199,   Continue atorvastatin 80 mg daily    Type II diabetes     Ascending aorta dilatation, 4.1 cm, noted on echo 4/2024    Abnormal stress test 5/2024 with nontransmural infarction in the inferior and inferolateral segments  Reviewed with primary cardiologist and no further work up indicated     Heart failure with mildly reduced ejection fraction  LVEF: 45-50%  Fluid Status: Euvolemic  Diuretic Regimen: None  Ischemic Eval: Noted above  GDMT:  Beta blocker: Carvedilol 6.25 mg twice daily  ACEI/ARB/ARNI: Losartan 100 mg daily  Aldactone antagonist: spironolactone 12.5 mg daily   SGLT2 inhibitor: None    Plan: continue with current medication regimen. Will reach out to clinic pharmacy for pricing of entresto and jardiance to optimize GDMT.     Endocrinology referral placed in July 2024. Patient never heard from the clinic to make  appointment. Referral placed again today to rule out primary hyperaldosteronism.     Follow up with Dr. Suresh in 6 months with labs and echo prior       Kavita Patel PA-C  Physician Assistant   [facility]  Pager: [phone]          History of Presenting Illness:    Chucho Ac is a very pleasant 68 year old male with a history of ischemic stroke, uncontrolled hypertension, type 2 diabetes, and hyperlipidemia.     In brief, patient was admitted at [facility] from 4/7/2024-4/10/2024 for a stroke. Cardiology was consulted as echo demonstrated LVEF 45-50% with wall motion abnormalities. His troponin was mildly elevated. Due to his stroke, heparin and anticoagulation were contraindicated and there was not acute clinical indication for coronary angiogram so further ischemic evaluation was recommended in the outpatient setting.     Lexiscan stress test done May 2024 was noted to be abnormal. There was a small area of nontransmural infarction in the mid to basal inferior and inferolateral segment(s) of the left ventricle associated with a mild degree of shannan-infarct ischemia.  Reviewed results with primary cardiologist and no further workup was indicated.    I had the pleasure of meeting Mr. Ac in June 2024.  His blood pressure was not well-controlled.  His amlodipine was increased to 10 mg daily.  Nursing was going to call him in 2 weeks to check-in on his and home blood pressure readings.     Blood pressure remained elevated at home 157 systolic. A renin-aldosterone level was checked and was elevated. He was referred for further workup for primary hyperaldosteronism with endocrinology.  He was started on spironolactone 12.5 mg daily.     Renal ultrasound done September 2024 was negative for renal artery stenosis.    Patient reports feeling well. He is accompanied by his daughter today. He has been checking his blood pressure at home and they have all been normal ranging from 103-130  systolic. He is following up with neurology next week.     Denies shortness of breath, orthopnea and PND. Denies chest pain, palpitations, lightheadedness, dizziness, near syncope and syncope.     Taking medications daily as prescribed.      Blood pressure 162/83 and HR 95 in clinic today. Blood pressure at home 105-130 systolic. Rechecked during OV, 142/80.           Social History       Social History     Socioeconomic History     Marital status:      Spouse name: Not on file     Number of children: 6     Years of education: Not on file     Highest education level: Not on file   Occupational History     Employer: [employer]   Tobacco Use     Smoking status: Never     Smokeless tobacco: Not on file   Substance and Sexual Activity     Alcohol use: No     Drug use: No     Sexual activity: Yes     Partners: Female   Other Topics Concern     Not on file   Social History Narrative    Native of [place] in [place]     Social Determinants of Health     Financial Resource Strain: Not on file   Food Insecurity: Not on file   Transportation Needs: Not on file   Physical Activity: Not on file   Stress: Not on file   Social Connections: Not on file   Interpersonal Safety: Not on file   Housing Stability: Low Risk  (3/14/2022)    Received from [address]    Housing Stability Vital Sign      Unable to Pay for Housing in the Last Year: No      Number of Places Lived in the Last Year: 1      Unstable Housing in the Last Year: No            Review of Systems:   Please see HPI         Physical Exam:   Vitals: There were no vitals taken for this visit.   Wt Readings from Last 4 Encounters:   06/24/24 88.5 kg (195 lb)   05/06/24 87.4 kg (192 lb 9.6 oz)   04/07/24 99.8 kg (220 lb)   12/16/11 99.3 kg (219 lb)     GEN: well nourished, in no acute distress.  NECK: Supple. JVP was not appreciated.  C/V:  Regular rate and rhythm, no murmur, rub or gallop.    RESP: Respirations are unlabored.  Clear to auscultation bilaterally without wheezing, rales, or rhonchi.  EXTREM: Bilateral lower extremities with no edema.   SKIN: Warm and dry.        Data:   LIPID RESULTS:  Lab Results   Component Value Date    CHOL 181 04/07/2024    CHOL 181 12/02/2011    HDL 28 (L) 04/07/2024    HDL 28 (L) 12/02/2011     (H) 04/07/2024    LDL 93 12/02/2011    TRIG 199 (H) 04/07/2024    TRIG 299 (H) 12/02/2011    CHOLHDLRATIO 6.4 (H) 12/02/2011     LIVER ENZYME RESULTS:  Lab Results   Component Value Date    AST 29 11/18/2011    ALT 22 11/18/2011     CBC RESULTS:  Lab Results   Component Value Date    WBC 4.2 04/08/2024    RBC 4.87 04/08/2024    HGB 12.9 (L) 04/08/2024    HCT 39.4 (L) 04/08/2024    MCV 81 04/08/2024    MCH 26.5 04/08/2024    MCHC 32.7 04/08/2024    RDW 12.7 04/08/2024     (L) 04/08/2024     BMP RESULTS:  Lab Results   Component Value Date     08/26/2024     12/02/2011    POTASSIUM 4.4 08/26/2024    POTASSIUM 3.9 12/02/2011    CHLORIDE 100 08/26/2024    CHLORIDE 103 12/02/2011    CO2 22 08/26/2024    CO2 24 12/02/2011    ANIONGAP 15 08/26/2024    ANIONGAP 15 12/02/2011     (H) 08/26/2024     (H) 04/10/2024     (H) 12/02/2011    BUN 18.6 08/26/2024    BUN 14 12/02/2011    CR 1.00 08/26/2024    CR 1.22 12/02/2011    GFRESTIMATED 81 08/26/2024    GFRESTIMATED 61 12/02/2011    GFRESTBLACK 74 12/02/2011    LEN 9.8 08/26/2024    LEN 9.3 12/02/2011      A1C RESULTS:  Lab Results   Component Value Date    A1C 7.3 (H) 04/07/2024     INR RESULTS:  Lab Results   Component Value Date    INR 1.07 04/07/2024            Medications     Current Outpatient Medications   Medication Sig Dispense Refill     amLODIPine (NORVASC) 10 MG tablet Take 1 tablet (10 mg) by mouth daily 30 tablet 3     aspirin (ASA) 325 MG EC tablet Take 1 tablet (325 mg) by mouth daily 90 tablet 0     atorvastatin (LIPITOR) 80 MG tablet Take 1 tablet (80 mg) by mouth every evening 90 tablet 0     Blood Pressure  "KIT Check BP daily 1 kit 0     carvedilol (COREG) 6.25 MG tablet Take 1 tablet (6.25 mg) by mouth 2 times daily (with meals) 90 tablet 0     losartan (COZAAR) 100 MG tablet Take 1 tablet (100 mg) by mouth daily 90 tablet 0     metFORMIN (GLUCOPHAGE) 1000 MG tablet Take 1 tablet (1,000 mg) by mouth 2 times daily (with meals) 90 tablet 0     spironolactone (ALDACTONE) 25 MG tablet Take 0.5 tablets (12.5 mg) by mouth daily 45 tablet 3          Past Medical History     Past Medical History:   Diagnosis Date     Benign essential hypertension      Hypertriglyceridemia      Mixed dyslipidemia      Obesity      Type 2 diabetes mellitus (H)      Past Surgical History:   Procedure Laterality Date     NO HISTORY OF SURGERY       Family History   Problem Relation Age of Onset     Family History Negative Mother          in 70s     Family History Negative Father          in 70s of \"natural causes\"     Coronary Artery Disease No family hx of      Cardiomyopathy No family hx of             Allergies   Patient has no known allergies.      This note was completed in part using dictation via the Dragon voice recognition software. Some word and grammatical errors may occur and must be interpreted in the appropriate clinical context.  If there are any questions pertaining to this issue, please contact me for further clarification.      Thank you for allowing me to participate in the care of your patient.      Sincerely,     Kavita Patel PA-C     [facility]  cc:   Kavita Patel PA-C  [address] [place] [address]      " Damián Jose

## 2024-09-29 ENCOUNTER — DOCUMENTATION ONLY (OUTPATIENT)
Dept: CARDIOLOGY | Facility: CLINIC | Age: 69
End: 2024-09-29
Payer: MEDICARE

## 2024-09-29 NOTE — PROGRESS NOTES
Patient has Medicare D through [employer], and Social Security subsidy.     Farxiga: $11.20/mo.   Jardiance: $11.20/mo.     Entresto: $11.20/mo.     Jennifer Guidry   Pharmacy Technician/Liaison, Discharge Pharmacy   [phone] (voice or text)  [email or web address]

## 2024-09-30 NOTE — PROGRESS NOTES
ESTABLISHED PATIENT NEUROLOGY NOTE    DATE OF VISIT: 10/1/2024  CLINIC LOCATION: [facility]  MRN: [id number]  PATIENT NAME: Chucho Ac  YOB: 1955    REASON FOR VISIT: No chief complaint on file.    SUBJECTIVE:                                                      HISTORY OF PRESENT ILLNESS: Patient is new to me, but was previously seen by stroke neurology team.  History and prior evaluation are briefly summarized below.  Please refer to previous neurology notes for more detailed information.    Per chart review, the patient has history of cardiomyopathy, diabetes mellitus type 2, hypertension, and hyperlipidemia.  On 4/7/2024 he presented to [facility] with right-sided numbness/weakness and difficulty speaking.    Brain MRI from 4/7/2024 demonstrated acute lacunar infarct in the right left central maxine and left frontoparietal corona radiata in background of moderate chronic small vessel ischemic changes.  Head and neck MRA's were normal.  All images were personally reviewed and independently interpreted.  30-day heart monitoring was suggested, but not ordered on discharge.    The patient was evaluated by inpatient stroke neurology team.  Additional evaluation included , and hemoglobin A1C of 7.3.  Echocardiogram demonstrated moderate to severe concentric left ventricular hypertrophy with mild inferior and inferior lateral hypokinesis.  Cardiac CT demonstrated no evidence of thrombus.  The patient was advised to take 325 of daily aspirin along with 80 mg of atorvastatin.  EXAM:                                                    Physical Exam:   Vitals: There were no vitals taken for this visit.    General: pt is in NAD, cooperative.  Skin: normal turgor, moist mucous membranes, no lesions/rashes noticed.  HEENT: ATNC, white sclera, normal conjunctiva.  Respiratory: Symmetric lung excursion, no accessory respiratory muscle use.  Abdomen: Non  distended.  Neurological: awake, cooperative, follows commands, no aphasia or dysarthria noted, cranial nerves II-XII: no ptosis, extraocular motility is full, face is symmetric, tongue is midline, equally moves all extremities, strength is 5/5 bilaterally in upper and lower extremities, no pronator drift, deep tendon reflexes are equal bilaterally, sensation to the light touch, vibration, and pinprick is intact bilaterally, finger to nose and heel-knee-shin tests are intact bilaterally, casual gait is normal.  ASSESSMENT AND PLAN:                                                    Assessment: 68 year old male patient presents for a follow-up of ***.  Heart monitoring.  Diagnoses:  No diagnosis found.  Plan:  There are no Patient Instructions on file for this visit.    Total Time: *** minutes spent on the date of the encounter doing chart review, history and exam, documentation and further activities per the note.    Dexter Cardona MD  [facility]  (Chart documentation was completed in part with Dragon voice-recognition software. Even though reviewed, some grammatical, spelling, and word errors may remain.)

## 2024-10-01 ENCOUNTER — OFFICE VISIT (OUTPATIENT)
Dept: NEUROLOGY | Facility: CLINIC | Age: 69
End: 2024-10-01
Attending: STUDENT IN AN ORGANIZED HEALTH CARE EDUCATION/TRAINING PROGRAM
Payer: MEDICARE

## 2024-10-01 VITALS — HEART RATE: 87 BPM | DIASTOLIC BLOOD PRESSURE: 73 MMHG | OXYGEN SATURATION: 99 % | SYSTOLIC BLOOD PRESSURE: 131 MMHG

## 2024-10-01 DIAGNOSIS — I63.9 ACUTE ISCHEMIC STROKE (H): Primary | ICD-10-CM

## 2024-10-01 PROCEDURE — 99215 OFFICE O/P EST HI 40 MIN: CPT | Performed by: PSYCHIATRY & NEUROLOGY

## 2024-10-01 NOTE — LETTER
10/1/2024      Chucho Ac  [address]  [place] [address]      Dear Colleague,    Thank you for referring your patient, Chucho Ac, to the [facility]. Please see a copy of my visit note below.    ESTABLISHED PATIENT NEUROLOGY NOTE    DATE OF VISIT: 10/1/2024  CLINIC LOCATION: [facility]  MRN: [id number]  PATIENT NAME: Chucho Ac  YOB: 1955    REASON FOR VISIT:   Chief Complaint   Patient presents with     Hospital F/U     Stroke Follow Up - per patient is still having some persistent weakness in left leg otherwise feels he is back to baseline      SUBJECTIVE:                                                      HISTORY OF PRESENT ILLNESS: Patient is new to me, but was previously seen by stroke neurology team.  History and prior evaluation are briefly summarized below.  Please refer to previous neurology notes for more detailed information. Accompanied by his daughter.    Per chart review, the patient has history of cardiomyopathy, diabetes mellitus type 2, hypertension, and hyperlipidemia.  On 4/7/2024 he presented to [facility] with right-sided numbness/weakness and difficulty speaking.    Brain MRI from 4/7/2024 demonstrated acute lacunar infarct in the right left central maxine and left frontoparietal corona radiata in background of moderate chronic small vessel ischemic changes.  Head and neck MRA's were normal.  All images were personally reviewed and independently interpreted.  30-day heart monitoring was suggested, but not ordered on discharge.    The patient was evaluated by inpatient stroke neurology team.  Additional evaluation included , and hemoglobin A1C of 7.3.  Echocardiogram demonstrated moderate to severe concentric left ventricular hypertrophy with mild inferior and inferior lateral hypokinesis.  Cardiac CT demonstrated no evidence of thrombus.  The patient was advised to take 325  mg of daily aspirin along with 80 mg of atorvastatin at bedtime.    Today, the patient reports that he recovered quite well.  He has right-sided weakness resolved.  Speech improved.  He complains of intermittent left thigh pain, but denies any new focal neurological symptoms.  He is compliant with medication regimen.  EXAM:                                                    Physical Exam:   Vitals: BP (!) 142/82 (BP Location: Left arm, Patient Position: Sitting, Cuff Size: Adult Regular)   Pulse 89   SpO2 99%     General: pt is in NAD, cooperative.  Skin: normal turgor, moist mucous membranes, no lesions/rashes noticed.  HEENT: ATNC, white sclera, normal conjunctiva.  Respiratory: Symmetric lung excursion, no accessory respiratory muscle use.  Abdomen: Non distended.  Neurological: awake, cooperative, follows commands, no aphasia or dysarthria noted, cranial nerves II-XII: no ptosis, extraocular motility is full, face is symmetric, tongue is midline, equally moves all extremities, strength is 5/5 bilaterally in upper and lower extremities, no pronator drift, deep tendon reflexes are equal bilaterally, except Achilles reflexes that are absent bilaterally sensation to the light touch, vibration, and pinprick is reduced in both feet, finger to nose and heel-knee-shin tests are intact bilaterally, casual gait is normal.  ASSESSMENT AND PLAN:                                                    Assessment: 68 year old male patient presents for a follow-up of stroke.  He reports that his symptoms mostly resolved, and he is not experience any new symptoms.  He is on appropriate secondary stroke prevention measures, but did not fully completed the workup.  30-day heart monitoring was not done.  We discussed that I would suggest doing it to check for possibility of atrial fibrillation.  Will do 2 of 14-day heart monitoring checks.  The rest of our discussion and recommendations are summarized below.    Chucho to follow up with  Primary Care provider regarding elevated blood pressure.     Diagnoses:    ICD-10-CM    1. Acute ischemic stroke (H)  I63.9 Stroke Hospital Follow Up (for neurologist use only)     Adult Cardiac Mobile Telemetry Monitor        Plan: At today's visit we thoroughly discussed current symptoms, results of stroke evaluation, symptoms and signs of stroke, secondary stroke prevention measures, and the plan.    Symptoms and signs of stroke were discussed.  I advised the patient to to call 911 with any SUDDEN onset of weakness, vision loss, speech problems, numbness, or severe headache of unknown cause.    For secondary stroke prevention, I counseled the patient to:  -Continue 325 mg of daily aspirin unless cardiac monitoring shows atrial fibrillation.  In such case, aspirin needs to be switched to oral anticoagulant under the guidance of his primary care provider  -Continue atorvastatin with LDL goals, listed below  -Long-term blood pressure goal is less than 130/85  -Long-term LDL goal is 40-70  -Long-term goal of hemoglobin A1C is less than 7.0  -Low-salt low-fat diet  -Regular aerobic exercise 30 minutes 3-4 times per week    Next follow-up appointment is on as needed basis.    Total Time: 43 minutes spent on the date of the encounter doing chart review, history and exam, documentation and further activities per the note.    Dexter Cardona MD  [facility] Neurology  (Chart documentation was completed in part with Dragon voice-recognition software. Even though reviewed, some grammatical, spelling, and word errors may remain.)     Chucho Ac is a 68 year old male who presents for:  Chief Complaint   Patient presents with     Hospital F/U     Stroke Follow Up - per patient is still having some persistent weakness in left leg otherwise feels he is back to baseline         Initial Vitals:  BP (!) 142/82 (BP Location: Left arm, Patient Position: Sitting, Cuff Size: Adult Regular)   Pulse 89   SpO2 99%   "Estimated body mass index is 25.96 kg/m  as calculated from the following:    Height as of 9/27/24: 1.778 m (5' 10\").    Weight as of 9/27/24: 82.1 kg (180 lb 14.4 oz).. There is no height or weight on file to calculate BSA. BP completed using cuff size: ce Nava       Again, thank you for allowing me to participate in the care of your patient.        Sincerely,        Dexter Cardona MD  "

## 2024-10-01 NOTE — PROGRESS NOTES
"Chucho Ac is a 68 year old male who presents for:  Chief Complaint   Patient presents with    Hospital F/U     Stroke Follow Up - per patient is still having some persistent weakness in left leg otherwise feels he is back to baseline         Initial Vitals:  BP (!) 142/82 (BP Location: Left arm, Patient Position: Sitting, Cuff Size: Adult Regular)   Pulse 89   SpO2 99%  Estimated body mass index is 25.96 kg/m  as calculated from the following:    Height as of 9/27/24: 1.778 m (5' 10\").    Weight as of 9/27/24: 82.1 kg (180 lb 14.4 oz).. There is no height or weight on file to calculate BSA. BP completed using cuff size: ce Nava   "

## 2024-10-01 NOTE — PATIENT INSTRUCTIONS
AFTER VISIT SUMMARY (AVS):    At today's visit we thoroughly discussed current symptoms, results of stroke evaluation, symptoms and signs of stroke, secondary stroke prevention measures, and the plan.    Symptoms and signs of stroke were discussed.  You should call 911 with any SUDDEN onset of weakness, vision loss, speech problems, numbness, or severe headache of unknown cause.    For secondary stroke prevention:  -Please continue 325 mg of daily aspirin unless cardiac monitoring shows atrial fibrillation.  In such case, aspirin needs to be switched to oral anticoagulant under the guidance of your primary care provider  -Continue atorvastatin with LDL goals, listed below  -Long-term blood pressure goal is less than 130/85  -Long-term LDL goal is 40-70  -Long-term goal of hemoglobin A1C is less than 7.0  -Low-salt low-fat diet  -Regular aerobic exercise 30 minutes 3-4 times per week    Next follow-up appointment is on as needed basis.    Please do not hesitate to call me with any questions or concerns.    Thanks.

## 2024-10-01 NOTE — PROGRESS NOTES
ESTABLISHED PATIENT NEUROLOGY NOTE    DATE OF VISIT: 10/1/2024  CLINIC LOCATION: [facility]  MRN: [id number]  PATIENT NAME: Chucho Ac  YOB: 1955    REASON FOR VISIT:   Chief Complaint   Patient presents with    Hospital F/U     Stroke Follow Up - per patient is still having some persistent weakness in left leg otherwise feels he is back to baseline      SUBJECTIVE:                                                      HISTORY OF PRESENT ILLNESS: Patient is new to me, but was previously seen by stroke neurology team.  History and prior evaluation are briefly summarized below.  Please refer to previous neurology notes for more detailed information. Accompanied by his daughter.    Per chart review, the patient has history of cardiomyopathy, diabetes mellitus type 2, hypertension, and hyperlipidemia.  On 4/7/2024 he presented to [facility] with right-sided numbness/weakness and difficulty speaking.    Brain MRI from 4/7/2024 demonstrated acute lacunar infarct in the right left central maxine and left frontoparietal corona radiata in background of moderate chronic small vessel ischemic changes.  Head and neck MRA's were normal.  All images were personally reviewed and independently interpreted.  30-day heart monitoring was suggested, but not ordered on discharge.    The patient was evaluated by inpatient stroke neurology team.  Additional evaluation included , and hemoglobin A1C of 7.3.  Echocardiogram demonstrated moderate to severe concentric left ventricular hypertrophy with mild inferior and inferior lateral hypokinesis.  Cardiac CT demonstrated no evidence of thrombus.  The patient was advised to take 325 mg of daily aspirin along with 80 mg of atorvastatin at bedtime.    Today, the patient reports that he recovered quite well.  He has right-sided weakness resolved.  Speech improved.  He complains of intermittent left thigh pain, but denies any new focal  neurological symptoms.  He is compliant with medication regimen.  EXAM:                                                    Physical Exam:   Vitals: BP (!) 142/82 (BP Location: Left arm, Patient Position: Sitting, Cuff Size: Adult Regular)   Pulse 89   SpO2 99%     General: pt is in NAD, cooperative.  Skin: normal turgor, moist mucous membranes, no lesions/rashes noticed.  HEENT: ATNC, white sclera, normal conjunctiva.  Respiratory: Symmetric lung excursion, no accessory respiratory muscle use.  Abdomen: Non distended.  Neurological: awake, cooperative, follows commands, no aphasia or dysarthria noted, cranial nerves II-XII: no ptosis, extraocular motility is full, face is symmetric, tongue is midline, equally moves all extremities, strength is 5/5 bilaterally in upper and lower extremities, no pronator drift, deep tendon reflexes are equal bilaterally, except Achilles reflexes that are absent bilaterally sensation to the light touch, vibration, and pinprick is reduced in both feet, finger to nose and heel-knee-shin tests are intact bilaterally, casual gait is normal.  ASSESSMENT AND PLAN:                                                    Assessment: 68 year old male patient presents for a follow-up of stroke.  He reports that his symptoms mostly resolved, and he is not experience any new symptoms.  He is on appropriate secondary stroke prevention measures, but did not fully completed the workup.  30-day heart monitoring was not done.  We discussed that I would suggest doing it to check for possibility of atrial fibrillation.  Will do 2 of 14-day heart monitoring checks.  The rest of our discussion and recommendations are summarized below.    Chucho to follow up with Primary Care provider regarding elevated blood pressure.     Diagnoses:    ICD-10-CM    1. Acute ischemic stroke (H)  I63.9 Stroke Hospital Follow Up (for neurologist use only)     Adult Cardiac Mobile Telemetry Monitor        Plan: At today's visit we  thoroughly discussed current symptoms, results of stroke evaluation, symptoms and signs of stroke, secondary stroke prevention measures, and the plan.    Symptoms and signs of stroke were discussed.  I advised the patient to to call 911 with any SUDDEN onset of weakness, vision loss, speech problems, numbness, or severe headache of unknown cause.    For secondary stroke prevention, I counseled the patient to:  -Continue 325 mg of daily aspirin unless cardiac monitoring shows atrial fibrillation.  In such case, aspirin needs to be switched to oral anticoagulant under the guidance of his primary care provider  -Continue atorvastatin with LDL goals, listed below  -Long-term blood pressure goal is less than 130/85  -Long-term LDL goal is 40-70  -Long-term goal of hemoglobin A1C is less than 7.0  -Low-salt low-fat diet  -Regular aerobic exercise 30 minutes 3-4 times per week    Next follow-up appointment is on as needed basis.    Total Time: 43 minutes spent on the date of the encounter doing chart review, history and exam, documentation and further activities per the note.    Dexter Cardona MD  [facility]  (Chart documentation was completed in part with Dragon voice-recognition software. Even though reviewed, some grammatical, spelling, and word errors may remain.)

## 2024-10-09 ENCOUNTER — TELEPHONE (OUTPATIENT)
Dept: CARDIOLOGY | Facility: CLINIC | Age: 69
End: 2024-10-09
Payer: MEDICARE

## 2024-10-09 NOTE — TELEPHONE ENCOUNTER
Patient called per below request from Kavita Patel PA-C to discuss starting jardiance. No answer, VM left asking patient to please return call.       --------------------------------------------------------------------------------------------------------------  ----- Message from Kavita Patel sent at 10/9/2024  8:32 AM CDT -----  Regarding: RE: Just following up- did you want us to call the daughter about these meds? Or discuss at next visit? Thanks  Thanks for circling back on this.     Can we please call patient and see if he would be willing to start jardiance 10 mg daily?     If so, please send Rx to pharmacy with a BMP check in 1 week.    ----- Message -----  From: Adela Bell RN  Sent: 10/2/2024  11:15 AM CDT  To: Kavita Patel PA-C; [facility] 2    Subject: Just following up- did you want us to call t#      ----- Message -----  From: Adela Bell RN  Sent: 9/27/2024   2:56 PM CDT  To: Kavita Patel PA-C; [facility] 2      ----- Message -----  From: Jennifer Guidry  Sent: 9/27/2024   2:50 PM CDT  To: Adela Bell RN    Patient has Medicare D through [employer], and Social Security subsidy.    Farxiga: $11.20/mo.  Jardiance: $11.20/mo.     Entresto: $11.20/mo.    Jennifer Guidry  Pharmacy Technician/Liaison, Discharge Pharmacy   [phone] (voice or text)  [email or web address]  ----- Message -----  From: Adela Bell RN  Sent: 9/27/2024   2:40 PM CDT  To: Rx Coverage Check For Heart Clinics; #      ----- Message -----  From: Kavita Patel PA-C  Sent: 9/27/2024   2:39 PM CDT  To: [facility] 2    Hi,     Can I please have coverage/cost checked for jardiance and entresto for this patient?     Thanks,   Kavita Patel PA-C  Physician Assistant   [facility]

## 2024-10-09 NOTE — TELEPHONE ENCOUNTER
Attempted to contact patient to discuss starting jardiance per JIN Kavita Patel. Left message for patient to call back to Team 2 R.N.s @ [phone]    No

## 2024-10-09 NOTE — LETTER
October 14, 2024       TO: Chucho Diazmike  [address]  [facility] [address]       Cat, Mr. Ac,    JIN Kavita Patel would like to add a medication to your therapy. It is called jardiance. This would be a 10mg dose once a day. This medication helps remove fluid and extra sugar from your body.    We checked your current cost at the pharmacy and it would be $11.20/month.    If you are ready to order this, please call the nurse line and verify your pharmacy choice. Once this is started, we will need a lab test to see if you tolerate it. That would be set up 2 weeks after starting the jardiance.    Thank you  Team 2 R.N.s  [phone]    [facility]

## 2024-10-14 NOTE — TELEPHONE ENCOUNTER
Attempted to reach the patient by phone. No answer. Left another message on the voice mail.    Letter sent:    [place], Mr. Ac,    JIN Kavita Patel would like to add a medication to your therapy. It is called jardiance. This would be a 10mg dose once a day. This medication helps remove fluid and extra sugar from your body.    We checked your current cost at the pharmacy and it would be $11.20/month.    If you are ready to order this, please call the nurse line and verify your pharmacy choice. Once this is started, we will need a lab test to see if you tolerate it. That would be set up 2 weeks after starting the jardiance.    Thank you  Team 2 R.N.s  [phone]

## 2024-10-17 ENCOUNTER — HOSPITAL ENCOUNTER (OUTPATIENT)
Dept: CARDIOLOGY | Facility: CLINIC | Age: 69
Discharge: HOME | End: 2024-10-17
Attending: PSYCHIATRY & NEUROLOGY
Payer: MEDICARE

## 2024-10-17 DIAGNOSIS — I63.9 ACUTE ISCHEMIC STROKE (H): ICD-10-CM

## 2024-10-17 PROCEDURE — 999N000096 CARDIAC MOBILE TELEMETRY MONITOR

## 2024-10-30 DIAGNOSIS — I10 BENIGN ESSENTIAL HYPERTENSION: ICD-10-CM

## 2024-10-30 RX ORDER — AMLODIPINE BESYLATE 10 MG/1
10 TABLET ORAL DAILY
Qty: 90 TABLET | Refills: 3 | Status: SHIPPED | OUTPATIENT
Start: 2024-10-30

## 2024-11-20 ENCOUNTER — TELEPHONE (OUTPATIENT)
Dept: CARDIOLOGY | Facility: CLINIC | Age: 69
End: 2024-11-20
Payer: MEDICARE

## 2024-11-20 NOTE — TELEPHONE ENCOUNTER
Patient called to follow up on previous phone calls and letter sent with recommendation to begin Jardiance. No answer, VM left informing patient of cost of medication and asking him to please return call to team 2 nurse line. Reminded to call [facility] to schedule lab apt, echocardiogram and JIN follow up for March 2025.

## (undated) RX ORDER — AMINOPHYLLINE 25 MG/ML
INJECTION, SOLUTION INTRAVENOUS
Status: DISPENSED
Start: 2024-05-06

## (undated) RX ORDER — REGADENOSON 0.08 MG/ML
INJECTION, SOLUTION INTRAVENOUS
Status: DISPENSED
Start: 2024-05-06